# Patient Record
Sex: FEMALE | Race: WHITE | HISPANIC OR LATINO
[De-identification: names, ages, dates, MRNs, and addresses within clinical notes are randomized per-mention and may not be internally consistent; named-entity substitution may affect disease eponyms.]

---

## 2017-02-14 ENCOUNTER — RX RENEWAL (OUTPATIENT)
Age: 82
End: 2017-02-14

## 2017-03-08 ENCOUNTER — APPOINTMENT (OUTPATIENT)
Dept: PULMONOLOGY | Facility: CLINIC | Age: 82
End: 2017-03-08

## 2017-03-08 VITALS — HEART RATE: 74 BPM | DIASTOLIC BLOOD PRESSURE: 70 MMHG | OXYGEN SATURATION: 97 % | SYSTOLIC BLOOD PRESSURE: 126 MMHG

## 2017-03-08 DIAGNOSIS — Z85.038 PERSONAL HISTORY OF OTHER MALIGNANT NEOPLASM OF LARGE INTESTINE: ICD-10-CM

## 2017-03-17 ENCOUNTER — OUTPATIENT (OUTPATIENT)
Dept: OUTPATIENT SERVICES | Facility: HOSPITAL | Age: 82
LOS: 1 days | Discharge: HOME | End: 2017-03-17

## 2017-03-17 DIAGNOSIS — C18.9 MALIGNANT NEOPLASM OF COLON, UNSPECIFIED: ICD-10-CM

## 2017-03-20 ENCOUNTER — APPOINTMENT (OUTPATIENT)
Dept: HEMATOLOGY ONCOLOGY | Facility: CLINIC | Age: 82
End: 2017-03-20

## 2017-03-20 VITALS
RESPIRATION RATE: 16 BRPM | TEMPERATURE: 98.6 F | SYSTOLIC BLOOD PRESSURE: 138 MMHG | DIASTOLIC BLOOD PRESSURE: 79 MMHG | HEART RATE: 102 BPM | OXYGEN SATURATION: 90 %

## 2017-03-22 ENCOUNTER — FORM ENCOUNTER (OUTPATIENT)
Age: 82
End: 2017-03-22

## 2017-03-23 ENCOUNTER — OUTPATIENT (OUTPATIENT)
Dept: OUTPATIENT SERVICES | Facility: HOSPITAL | Age: 82
LOS: 1 days | End: 2017-03-23
Payer: MEDICARE

## 2017-03-23 ENCOUNTER — APPOINTMENT (OUTPATIENT)
Dept: RADIOLOGY | Facility: IMAGING CENTER | Age: 82
End: 2017-03-23

## 2017-03-23 DIAGNOSIS — J44.9 CHRONIC OBSTRUCTIVE PULMONARY DISEASE, UNSPECIFIED: ICD-10-CM

## 2017-03-23 PROCEDURE — 71046 X-RAY EXAM CHEST 2 VIEWS: CPT

## 2017-03-28 ENCOUNTER — APPOINTMENT (OUTPATIENT)
Dept: COLORECTAL SURGERY | Facility: CLINIC | Age: 82
End: 2017-03-28

## 2017-03-28 VITALS
BODY MASS INDEX: 29.44 KG/M2 | HEART RATE: 80 BPM | RESPIRATION RATE: 16 BRPM | HEIGHT: 62 IN | WEIGHT: 160 LBS | SYSTOLIC BLOOD PRESSURE: 141 MMHG | DIASTOLIC BLOOD PRESSURE: 84 MMHG | OXYGEN SATURATION: 94 %

## 2017-03-28 RX ORDER — ALPRAZOLAM 0.5 MG/1
0.5 TABLET ORAL
Refills: 0 | Status: ACTIVE | COMMUNITY

## 2017-03-28 RX ORDER — OXYCODONE AND ACETAMINOPHEN 10; 325 MG/1; MG/1
10-325 TABLET ORAL
Refills: 0 | Status: ACTIVE | COMMUNITY

## 2017-03-28 RX ORDER — DEXTROMETHORPHAN HYDROBROMIDE AND QUINIDINE SULFATE 20; 10 MG/1; MG/1
20-10 CAPSULE, GELATIN COATED ORAL
Refills: 0 | Status: ACTIVE | COMMUNITY

## 2017-03-28 RX ORDER — ECONAZOLE NITRATE 10 MG/G
1 CREAM TOPICAL
Refills: 0 | Status: ACTIVE | COMMUNITY

## 2017-04-11 ENCOUNTER — OUTPATIENT (OUTPATIENT)
Dept: OUTPATIENT SERVICES | Facility: HOSPITAL | Age: 82
LOS: 1 days | End: 2017-04-11
Payer: MEDICARE

## 2017-04-11 ENCOUNTER — APPOINTMENT (OUTPATIENT)
Dept: CT IMAGING | Facility: IMAGING CENTER | Age: 82
End: 2017-04-11

## 2017-04-11 DIAGNOSIS — Z00.8 ENCOUNTER FOR OTHER GENERAL EXAMINATION: ICD-10-CM

## 2017-04-11 PROCEDURE — 74177 CT ABD & PELVIS W/CONTRAST: CPT | Mod: 26

## 2017-04-11 PROCEDURE — 74177 CT ABD & PELVIS W/CONTRAST: CPT

## 2017-04-11 PROCEDURE — 82565 ASSAY OF CREATININE: CPT

## 2017-04-20 ENCOUNTER — OUTPATIENT (OUTPATIENT)
Dept: OUTPATIENT SERVICES | Facility: HOSPITAL | Age: 82
LOS: 1 days | End: 2017-04-20
Payer: MEDICARE

## 2017-04-20 ENCOUNTER — APPOINTMENT (OUTPATIENT)
Dept: ULTRASOUND IMAGING | Facility: IMAGING CENTER | Age: 82
End: 2017-04-20

## 2017-04-20 DIAGNOSIS — Z00.8 ENCOUNTER FOR OTHER GENERAL EXAMINATION: ICD-10-CM

## 2017-04-20 PROCEDURE — 76700 US EXAM ABDOM COMPLETE: CPT

## 2017-05-02 ENCOUNTER — APPOINTMENT (OUTPATIENT)
Dept: PULMONOLOGY | Facility: CLINIC | Age: 82
End: 2017-05-02

## 2017-05-02 VITALS — SYSTOLIC BLOOD PRESSURE: 118 MMHG | OXYGEN SATURATION: 93 % | DIASTOLIC BLOOD PRESSURE: 56 MMHG | HEART RATE: 87 BPM

## 2017-05-02 DIAGNOSIS — Z01.818 ENCOUNTER FOR OTHER PREPROCEDURAL EXAMINATION: ICD-10-CM

## 2017-05-23 ENCOUNTER — APPOINTMENT (OUTPATIENT)
Dept: COLORECTAL SURGERY | Facility: CLINIC | Age: 82
End: 2017-05-23

## 2017-07-21 ENCOUNTER — APPOINTMENT (OUTPATIENT)
Dept: PULMONOLOGY | Facility: CLINIC | Age: 82
End: 2017-07-21

## 2017-07-21 VITALS — HEART RATE: 90 BPM | SYSTOLIC BLOOD PRESSURE: 124 MMHG | DIASTOLIC BLOOD PRESSURE: 76 MMHG | OXYGEN SATURATION: 92 %

## 2017-08-30 ENCOUNTER — RX RENEWAL (OUTPATIENT)
Age: 82
End: 2017-08-30

## 2017-10-24 ENCOUNTER — APPOINTMENT (OUTPATIENT)
Dept: PULMONOLOGY | Facility: CLINIC | Age: 82
End: 2017-10-24
Payer: MEDICARE

## 2017-10-24 VITALS
OXYGEN SATURATION: 92 % | HEART RATE: 97 BPM | SYSTOLIC BLOOD PRESSURE: 126 MMHG | DIASTOLIC BLOOD PRESSURE: 80 MMHG | TEMPERATURE: 98 F

## 2017-10-24 PROCEDURE — 99214 OFFICE O/P EST MOD 30 MIN: CPT

## 2018-01-17 ENCOUNTER — RX RENEWAL (OUTPATIENT)
Age: 83
End: 2018-01-17

## 2018-02-06 ENCOUNTER — RX RENEWAL (OUTPATIENT)
Age: 83
End: 2018-02-06

## 2018-03-19 ENCOUNTER — RX RENEWAL (OUTPATIENT)
Age: 83
End: 2018-03-19

## 2018-03-20 ENCOUNTER — RX RENEWAL (OUTPATIENT)
Age: 83
End: 2018-03-20

## 2018-05-15 ENCOUNTER — RX RENEWAL (OUTPATIENT)
Age: 83
End: 2018-05-15

## 2018-05-18 ENCOUNTER — RESULT REVIEW (OUTPATIENT)
Age: 83
End: 2018-05-18

## 2018-05-18 ENCOUNTER — INPATIENT (INPATIENT)
Facility: HOSPITAL | Age: 83
LOS: 0 days | Discharge: HOME | End: 2018-05-19
Attending: HOSPITALIST | Admitting: HOSPITALIST
Payer: MEDICARE

## 2018-05-18 ENCOUNTER — OUTPATIENT (OUTPATIENT)
Dept: OUTPATIENT SERVICES | Facility: HOSPITAL | Age: 83
LOS: 1 days | Discharge: HOME | End: 2018-05-18

## 2018-05-18 ENCOUNTER — APPOINTMENT (OUTPATIENT)
Dept: HEMATOLOGY ONCOLOGY | Facility: CLINIC | Age: 83
End: 2018-05-18
Payer: MEDICARE

## 2018-05-18 VITALS
OXYGEN SATURATION: 96 % | SYSTOLIC BLOOD PRESSURE: 150 MMHG | TEMPERATURE: 98 F | RESPIRATION RATE: 20 BRPM | HEART RATE: 88 BPM | DIASTOLIC BLOOD PRESSURE: 58 MMHG

## 2018-05-18 VITALS
TEMPERATURE: 98 F | RESPIRATION RATE: 16 BRPM | HEART RATE: 88 BPM | SYSTOLIC BLOOD PRESSURE: 120 MMHG | DIASTOLIC BLOOD PRESSURE: 72 MMHG | OXYGEN SATURATION: 92 %

## 2018-05-18 DIAGNOSIS — C18.9 MALIGNANT NEOPLASM OF COLON, UNSPECIFIED: ICD-10-CM

## 2018-05-18 DIAGNOSIS — F03.91 UNSPECIFIED DEMENTIA WITH BEHAVIORAL DISTURBANCE: ICD-10-CM

## 2018-05-18 DIAGNOSIS — R60.1 GENERALIZED EDEMA: ICD-10-CM

## 2018-05-18 DIAGNOSIS — R14.0 ABDOMINAL DISTENSION (GASEOUS): ICD-10-CM

## 2018-05-18 DIAGNOSIS — H57.02 ANISOCORIA: ICD-10-CM

## 2018-05-18 DIAGNOSIS — D64.9 ANEMIA, UNSPECIFIED: ICD-10-CM

## 2018-05-18 DIAGNOSIS — J44.1 CHRONIC OBSTRUCTIVE PULMONARY DISEASE WITH (ACUTE) EXACERBATION: ICD-10-CM

## 2018-05-18 DIAGNOSIS — Z90.49 ACQUIRED ABSENCE OF OTHER SPECIFIED PARTS OF DIGESTIVE TRACT: Chronic | ICD-10-CM

## 2018-05-18 DIAGNOSIS — Z29.9 ENCOUNTER FOR PROPHYLACTIC MEASURES, UNSPECIFIED: ICD-10-CM

## 2018-05-18 DIAGNOSIS — K92.1 MELENA: ICD-10-CM

## 2018-05-18 LAB
ALBUMIN SERPL ELPH-MCNC: 3.6 G/DL — SIGNIFICANT CHANGE UP (ref 3.3–5)
ALP SERPL-CCNC: 72 U/L — SIGNIFICANT CHANGE UP (ref 40–120)
ALT FLD-CCNC: 10 U/L — SIGNIFICANT CHANGE UP (ref 4–33)
APTT BLD: 28.3 SEC — SIGNIFICANT CHANGE UP (ref 27.5–37.4)
AST SERPL-CCNC: 17 U/L — SIGNIFICANT CHANGE UP (ref 4–32)
BASOPHILS # BLD AUTO: 0 K/UL — SIGNIFICANT CHANGE UP (ref 0–0.2)
BASOPHILS # BLD AUTO: 0.02 K/UL — SIGNIFICANT CHANGE UP (ref 0–0.2)
BASOPHILS NFR BLD AUTO: 0.2 % — SIGNIFICANT CHANGE UP (ref 0–2)
BASOPHILS NFR BLD AUTO: 0.5 % — SIGNIFICANT CHANGE UP (ref 0–2)
BILIRUB SERPL-MCNC: < 0.2 MG/DL — LOW (ref 0.2–1.2)
BLD GP AB SCN SERPL QL: NEGATIVE — SIGNIFICANT CHANGE UP
BUN SERPL-MCNC: 25 MG/DL — HIGH (ref 7–23)
CALCIUM SERPL-MCNC: 9.6 MG/DL — SIGNIFICANT CHANGE UP (ref 8.4–10.5)
CHLORIDE SERPL-SCNC: 91 MMOL/L — LOW (ref 98–107)
CO2 SERPL-SCNC: 37 MMOL/L — HIGH (ref 22–31)
CREAT SERPL-MCNC: 1.32 MG/DL — HIGH (ref 0.5–1.3)
EOSINOPHIL # BLD AUTO: 0.7 K/UL — HIGH (ref 0–0.5)
EOSINOPHIL # BLD AUTO: 0.71 K/UL — HIGH (ref 0–0.5)
EOSINOPHIL NFR BLD AUTO: 7.8 % — HIGH (ref 0–6)
EOSINOPHIL NFR BLD AUTO: 7.9 % — HIGH (ref 0–6)
FERRITIN SERPL-MCNC: 13.55 NG/ML — LOW (ref 15–150)
GLUCOSE SERPL-MCNC: 134 MG/DL — HIGH (ref 70–99)
HCT VFR BLD CALC: 24.8 % — LOW (ref 34.5–45)
HCT VFR BLD CALC: 26.7 % — LOW (ref 34.5–45)
HGB BLD-MCNC: 7.6 G/DL — LOW (ref 11.5–15.5)
HGB BLD-MCNC: 7.8 G/DL — LOW (ref 11.5–15.5)
IMM GRANULOCYTES # BLD AUTO: 0.03 # — SIGNIFICANT CHANGE UP
IMM GRANULOCYTES NFR BLD AUTO: 0.3 % — SIGNIFICANT CHANGE UP (ref 0–1.5)
INR BLD: 0.98 — SIGNIFICANT CHANGE UP (ref 0.88–1.17)
IRON SATN MFR SERPL: 24 UG/DL — LOW (ref 30–160)
IRON SATN MFR SERPL: 424 UG/DL — SIGNIFICANT CHANGE UP (ref 140–530)
LYMPHOCYTES # BLD AUTO: 1.4 K/UL — SIGNIFICANT CHANGE UP (ref 1–3.3)
LYMPHOCYTES # BLD AUTO: 1.49 K/UL — SIGNIFICANT CHANGE UP (ref 1–3.3)
LYMPHOCYTES # BLD AUTO: 15.9 % — SIGNIFICANT CHANGE UP (ref 13–44)
LYMPHOCYTES # BLD AUTO: 16.4 % — SIGNIFICANT CHANGE UP (ref 13–44)
MCHC RBC-ENTMCNC: 23.5 PG — LOW (ref 27–34)
MCHC RBC-ENTMCNC: 24.4 PG — LOW (ref 27–34)
MCHC RBC-ENTMCNC: 28.5 % — LOW (ref 32–36)
MCHC RBC-ENTMCNC: 31.4 G/DL — LOW (ref 32–36)
MCV RBC AUTO: 77.9 FL — LOW (ref 80–100)
MCV RBC AUTO: 82.4 FL — SIGNIFICANT CHANGE UP (ref 80–100)
MONOCYTES # BLD AUTO: 0.82 K/UL — SIGNIFICANT CHANGE UP (ref 0–0.9)
MONOCYTES # BLD AUTO: 0.9 K/UL — SIGNIFICANT CHANGE UP (ref 0–0.9)
MONOCYTES NFR BLD AUTO: 10.1 % — SIGNIFICANT CHANGE UP (ref 2–14)
MONOCYTES NFR BLD AUTO: 9.1 % — SIGNIFICANT CHANGE UP (ref 2–14)
NEUTROPHILS # BLD AUTO: 5.8 K/UL — SIGNIFICANT CHANGE UP (ref 1.8–7.4)
NEUTROPHILS # BLD AUTO: 5.99 K/UL — SIGNIFICANT CHANGE UP (ref 1.8–7.4)
NEUTROPHILS NFR BLD AUTO: 65.6 % — SIGNIFICANT CHANGE UP (ref 43–77)
NEUTROPHILS NFR BLD AUTO: 66.2 % — SIGNIFICANT CHANGE UP (ref 43–77)
NRBC # FLD: 0 — SIGNIFICANT CHANGE UP
NT-PROBNP SERPL-SCNC: 90.12 PG/ML — SIGNIFICANT CHANGE UP
OB PNL STL: POSITIVE — SIGNIFICANT CHANGE UP
PLATELET # BLD AUTO: 150 K/UL — SIGNIFICANT CHANGE UP (ref 150–400)
PLATELET # BLD AUTO: 204 K/UL — SIGNIFICANT CHANGE UP (ref 150–400)
PMV BLD: 10.3 FL — SIGNIFICANT CHANGE UP (ref 7–13)
POTASSIUM SERPL-MCNC: 5.2 MMOL/L — SIGNIFICANT CHANGE UP (ref 3.5–5.3)
POTASSIUM SERPL-SCNC: 5.2 MMOL/L — SIGNIFICANT CHANGE UP (ref 3.5–5.3)
PROT SERPL-MCNC: 7.9 G/DL — SIGNIFICANT CHANGE UP (ref 6–8.3)
PROTHROM AB SERPL-ACNC: 10.9 SEC — SIGNIFICANT CHANGE UP (ref 9.8–13.1)
RBC # BLD: 3.18 M/UL — LOW (ref 3.8–5.2)
RBC # BLD: 3.24 M/UL — LOW (ref 3.8–5.2)
RBC # FLD: 16.5 % — HIGH (ref 10.3–14.5)
RBC # FLD: 16.8 % — HIGH (ref 10.3–14.5)
RETICS #: 54 K/UL — SIGNIFICANT CHANGE UP (ref 25–125)
RETICS/RBC NFR: 1.7 % — SIGNIFICANT CHANGE UP (ref 0.5–2.5)
RH IG SCN BLD-IMP: POSITIVE — SIGNIFICANT CHANGE UP
RH IG SCN BLD-IMP: POSITIVE — SIGNIFICANT CHANGE UP
SODIUM SERPL-SCNC: 136 MMOL/L — SIGNIFICANT CHANGE UP (ref 135–145)
TROPONIN T SERPL-MCNC: < 0.06 NG/ML — SIGNIFICANT CHANGE UP (ref 0–0.06)
UIBC SERPL-MCNC: 400.1 UG/DL — HIGH (ref 110–370)
WBC # BLD: 8.8 K/UL — SIGNIFICANT CHANGE UP (ref 3.8–10.5)
WBC # BLD: 9.06 K/UL — SIGNIFICANT CHANGE UP (ref 3.8–10.5)
WBC # FLD AUTO: 8.8 K/UL — SIGNIFICANT CHANGE UP (ref 3.8–10.5)
WBC # FLD AUTO: 9.06 K/UL — SIGNIFICANT CHANGE UP (ref 3.8–10.5)

## 2018-05-18 PROCEDURE — 99215 OFFICE O/P EST HI 40 MIN: CPT

## 2018-05-18 PROCEDURE — 71045 X-RAY EXAM CHEST 1 VIEW: CPT | Mod: 26

## 2018-05-18 PROCEDURE — 99223 1ST HOSP IP/OBS HIGH 75: CPT | Mod: AI,GC

## 2018-05-18 RX ORDER — IPRATROPIUM/ALBUTEROL SULFATE 18-103MCG
3 AEROSOL WITH ADAPTER (GRAM) INHALATION EVERY 6 HOURS
Qty: 0 | Refills: 0 | Status: DISCONTINUED | OUTPATIENT
Start: 2018-05-18 | End: 2018-05-19

## 2018-05-18 RX ORDER — ALBUTEROL 90 UG/1
2.5 AEROSOL, METERED ORAL ONCE
Qty: 0 | Refills: 0 | Status: COMPLETED | OUTPATIENT
Start: 2018-05-18 | End: 2018-05-18

## 2018-05-18 RX ORDER — ESCITALOPRAM OXALATE 10 MG/1
10 TABLET, FILM COATED ORAL DAILY
Qty: 0 | Refills: 0 | Status: DISCONTINUED | OUTPATIENT
Start: 2018-05-18 | End: 2018-05-19

## 2018-05-18 RX ORDER — ASPIRIN/CALCIUM CARB/MAGNESIUM 324 MG
81 TABLET ORAL DAILY
Qty: 0 | Refills: 0 | Status: DISCONTINUED | OUTPATIENT
Start: 2018-05-18 | End: 2018-05-19

## 2018-05-18 RX ORDER — SIMVASTATIN 20 MG/1
20 TABLET, FILM COATED ORAL AT BEDTIME
Qty: 0 | Refills: 0 | Status: DISCONTINUED | OUTPATIENT
Start: 2018-05-18 | End: 2018-05-19

## 2018-05-18 RX ORDER — LEVOTHYROXINE SODIUM 125 MCG
100 TABLET ORAL DAILY
Qty: 0 | Refills: 0 | Status: DISCONTINUED | OUTPATIENT
Start: 2018-05-18 | End: 2018-05-19

## 2018-05-18 RX ORDER — MIRTAZAPINE 45 MG/1
15 TABLET, ORALLY DISINTEGRATING ORAL AT BEDTIME
Qty: 0 | Refills: 0 | Status: DISCONTINUED | OUTPATIENT
Start: 2018-05-18 | End: 2018-05-19

## 2018-05-18 RX ORDER — ALPRAZOLAM 0.25 MG
0.5 TABLET ORAL
Qty: 0 | Refills: 0 | Status: DISCONTINUED | OUTPATIENT
Start: 2018-05-18 | End: 2018-05-19

## 2018-05-18 RX ORDER — QUETIAPINE FUMARATE 200 MG/1
300 TABLET, FILM COATED ORAL
Qty: 0 | Refills: 0 | Status: DISCONTINUED | OUTPATIENT
Start: 2018-05-18 | End: 2018-05-19

## 2018-05-18 RX ORDER — FUROSEMIDE 40 MG
40 TABLET ORAL ONCE
Qty: 0 | Refills: 0 | Status: COMPLETED | OUTPATIENT
Start: 2018-05-18 | End: 2018-05-18

## 2018-05-18 RX ADMIN — Medication 40 MILLIGRAM(S): at 18:50

## 2018-05-18 RX ADMIN — SIMVASTATIN 20 MILLIGRAM(S): 20 TABLET, FILM COATED ORAL at 22:59

## 2018-05-18 RX ADMIN — MIRTAZAPINE 15 MILLIGRAM(S): 45 TABLET, ORALLY DISINTEGRATING ORAL at 22:59

## 2018-05-18 RX ADMIN — Medication 0.5 MILLIGRAM(S): at 18:50

## 2018-05-18 RX ADMIN — Medication 3 MILLILITER(S): at 22:13

## 2018-05-18 RX ADMIN — QUETIAPINE FUMARATE 300 MILLIGRAM(S): 200 TABLET, FILM COATED ORAL at 18:50

## 2018-05-18 RX ADMIN — ALBUTEROL 2.5 MILLIGRAM(S): 90 AEROSOL, METERED ORAL at 14:34

## 2018-05-18 RX ADMIN — Medication 0.5 MILLIGRAM(S): at 23:00

## 2018-05-18 NOTE — ED ADULT NURSE NOTE - OBJECTIVE STATEMENT
Received pt. in room 6 sent by oncologist to ER for low hemoglobin and a blood transfusion. Patient have a history of colorectal cancer, dementia. As stated by son at bedside  patient went to her PCP for a check-up hemoglobin is low, pt. sent to oncologist for further evaluation. Patient is awake and non verbal that is patient's baseline as per son, make eye contact. Both lower legs and hands with edema. Placed on cardiac monitor, labs sent will continue to monitor

## 2018-05-18 NOTE — H&P ADULT - PROBLEM SELECTOR PLAN 5
Patietn with distended abdomen likely 2/2 to malignancy with mets  Nontender on exam, passing BM   Patient tolerating PO   Will defer imagining for now Patient with diffuse wheezing on exam, known COPD on 2L home oxygen  Duonebs standing   Will continue to monitor

## 2018-05-18 NOTE — ED PROVIDER NOTE - NONTENDER LOCATION
left lower quadrant/umbilical/right costovertebral angle/left upper quadrant/right upper quadrant/right lower quadrant/periumbilical/left costovertebral angle/suprapubic

## 2018-05-18 NOTE — ED ADULT NURSE NOTE - CHPI ED SYMPTOMS NEG
no pain/no vomiting/no decreased eating/drinking/no nausea/no numbness/no tingling/no dizziness/no fever/no chills/no weakness

## 2018-05-18 NOTE — ED ADULT TRIAGE NOTE - CHIEF COMPLAINT QUOTE
Pt was sent in by her oncologist for a blood transfusion.  Pt in traige is noted with be slight sob and wheezing she is currently on oxygen 24/7 at two liters pt non verbal at baseline.

## 2018-05-18 NOTE — H&P ADULT - PROBLEM SELECTOR PLAN 8
DVT: SCDs given active bleed  Diet: Pureed (home)  GOC: Per family, son HCP, patient DNR/DNI, no form in chart, will sign when son arrives. Family requesting hospice referral DVT: SCDs given active bleed  Diet: Pureed (home)  GOC: Per family, son HCP, patient DNR/DNI, no form in chart, will sign and put in chart. Family requesting hospice referral. Will sign MOLST.

## 2018-05-18 NOTE — H&P ADULT - PROBLEM SELECTOR PLAN 1
Patient with Hg 7.6, unclear baseline, per records 11 in 2017 likely chronic anemia given HD stability, FOCT +, however known colon malignancy   Will transfuse 1 unit as patient symptomatic with less ambulation  (goal >7)  Will f/u iron studies  Will continue to monitor Patient with Hg 7.6, unclear baseline, per records 11 in 2017 likely chronic anemia given HD stability, FOCT +, however known colon malignancy   Will transfuse 1 unit as patient symptomatic with less ambulation  (goal 7-8)  Will f/u iron studies  Will continue to monitor

## 2018-05-18 NOTE — ED PROVIDER NOTE - OBJECTIVE STATEMENT
Pt is an 89 y/o F nonsmoker PMHx dementia, COPD (on 2 L n/c at baseline), colon cancer (h/o resection, recurrence for ~ 1.5 years; family declined surgery, chemo/radiation) sent to [facility] ED for transfusion.  History largely obtained by pt's son.  Pt had routine follow up with PMD Dr. Granados 2 wks ago.  Pt was notified yesterday that pt had Hgb 7.7.  Pt family attempted to set up outpatient transfusion with oncologist Dr. Sebastián Lockett who requested pt go to [facility] ED for transfusion.  Pt's family also notes that pt has developed generalized edema, which pt's family states happens every few weeks, then resolves.  Otherwise, pt's son insists pt is completely at baseline.  Pt has been able to ambulate to and from bathroom without difficulty.  Family notes brbpr yesterday.  No known fevers, vomiting, chest pain, abdominal pain, diarrhea, use of blood thinners.

## 2018-05-18 NOTE — H&P ADULT - PROBLEM SELECTOR PLAN 3
Patient with generalized edema to face, arms, legs to above knees 2+  No known cardiac hx, or cirrhosis however likely mets   Will give Lasix (home dosage 40 every other day with 20 mg)  UA ordered to evaluate protein   Will give IV Lasix   Will consider TTE Patient with generalized edema to face, arms, legs to above knees 2+  No known cardiac hx, or cirrhosis however likely mets   Will give Lasix (home dosage 40 every other day with 20 mg)  UA ordered to evaluate protein   Will give IV Lasix

## 2018-05-18 NOTE — H&P ADULT - ATTENDING COMMENTS
Pt's daughter in law asking for Hospice evaluation.  Team to confirm with son, who is HCA, re AD/GOC.

## 2018-05-18 NOTE — ED PROVIDER NOTE - PROGRESS NOTE DETAILS
LAVON GUZMÁN:  Guiac positive.  Hgb 7.6.  Pt accepted to Dr. Gonzalez.  Discussed case with Dr. Granados who states pt's baseline hgb 11 < 1 year ago.  MAR text paged at this time.

## 2018-05-18 NOTE — ED PROVIDER NOTE - ATTENDING CONTRIBUTION TO CARE
89yo F PMH: dementia, COPD (on home 02), colon cancer (h/o resection, with recurrence) sent to [facility] ED for transfusion, family notes no other specific acute complaints, pt does have SOB but unchanged at baseline  On exam awake & alert, mmm, lungs diffuse, RRR, abdomen soft NT/ND, 2+ pulses b/l, neuro A&Ox0,  skin warm and dry no rash

## 2018-05-18 NOTE — H&P ADULT - ASSESSMENT
89F with PMHx of PMHx of dementia (AOx0), COPD (on home O2 2L), colon cancer (h/o resection, recurrence for ~ 1.5 years; family declined surgery, chemo/radiation) sent to [facility] ED for transfusion admitted with Hg 7.6 likely 2/2 to chronic anemia 2/2 to malignancy given HD stability

## 2018-05-18 NOTE — H&P ADULT - PROBLEM SELECTOR PLAN 4
Patient with diffuse wheezing on exam, known COPD on 2L home oxygen  Duonebs, will consider steroids and abx  Will continue to monitor Patient with diffuse wheezing on exam, known COPD on 2L home oxygen  Duonebs standing   Will continue to monitor Patient with demential, AOx0 at baseline, minimally verbal  Continue home medications  ISTOP [id number], Alprazolam and oxycodone 30 IR confirmed (in chart)  Currently at baseline

## 2018-05-18 NOTE — H&P ADULT - PROBLEM SELECTOR PLAN 7
Patient with demential, AOx0 at baseline, minimally verbal  Continue home medications  Currently at baseline Patient with demential, AOx0 at baseline, minimally verbal  Continue home medications  ISTOP [id number], Alprazolam and oxycodone 30 IR confirmed (in chart)  Currently at baseline Patient with unequal pupils RT larger than LT  Possibly 2/2 to mets, difficult to assess neurological function  Will consider CTH

## 2018-05-18 NOTE — H&P ADULT - NSHPPHYSICALEXAM_GEN_ALL_CORE
· Constitutional        Lying in bed comfortably. No acute distress, tongue motion, minimally verbal  · HEENT	               pupils UNEQUAL, but reactive  · Neck	               Supple; no JVD  · Back	                ROM intact  · Respiratory             good air movement; diffuse expiratory wheezing bilaterally   · Cardiovascular       S1 & S2 with RRR; no murmurs, rales or JVD  · Gastrointestinal     ++distended, slightly firm, bowel sounds appreciated, nontender  · Extremities             2+ pitting edema to knees   · Vascular	               Radial Pulse: right normal; left normal  · Neurological           alert and oriented x 0, minimally verbal, does not comprehend (baseline)  · Skin	               warm and dry  · Musculoskeletal    no calf tenderness; diminished strength  · Psychiatric              at baseline per family

## 2018-05-18 NOTE — H&P ADULT - NSHPLABSRESULTS_GEN_ALL_CORE
T(C): 36.8 (05-18-18 @ 16:46), Max: 36.9 (05-18-18 @ 12:03)  T(F): 98.2 (05-18-18 @ 16:46), Max: 98.5 (05-18-18 @ 12:03)  HR: 88 (05-18-18 @ 16:46) (87 - 88)  BP: 154/58 (05-18-18 @ 16:46) (150/58 - 170/66)  RR: 20 (05-18-18 @ 16:46) (20 - 22)  SpO2: 100% (05-18-18 @ 16:46) (96% - 100%)                          7.6    9.06  )-----------( 204      ( 18 May 2018 13:08 )             26.7       05-18    136  |  91<L>  |  25<H>  ----------------------------<  134<H>  5.2   |  37<H>  |  1.32<H>    Ca    9.6      18 May 2018 13:08    TPro  7.9  /  Alb  3.6  /  TBili  < 0.2<L>  /  DBili  x   /  AST  17  /  ALT  10  /  AlkPhos  72  05-18        < from: Xray Chest 1 View- PORTABLE-Urgent (05.18.18 @ 13:31) >    INTERPRETATION:  CLINICAL INDICATION: wheezing    EXAM:  Portable upright AP chest from 5/18/2018 at 1331. Compared to prior study   from 3/23/2017.    Projection lordotic.  Slightly rotated right.     IMPRESSION:  Underinflated but otherwise clear lungs. No pleural effusions or   pneumothorax.    Stable sightly prominent appearing cardiac and mediastinal silhouettes.    Trachea projects to right of midline but proportionate to degree of   patient rotation.    Generalized osteopenia and bilateral shoulder degenerative changes again   noted.    Nonspecific slightly distended bowel noted in visualized upper abdomen,   correlate clinically with dedicated abdominal imaging suggested to   further assess if warranted. No subdiaphragmatic free air.     Right upper quadrant surgical clips present.      < end of copied text >

## 2018-05-18 NOTE — H&P ADULT - HISTORY OF PRESENT ILLNESS
Pt is an 88 y/o F nonsmoker PMHx dementia, COPD (on 2 L n/c at baseline), colon cancer (h/o resection, recurrence for ~ 1.5 years; family declined surgery, chemo/radiation) sent to [facility] ED for transfusion.  History largely obtained by pt's son.  Pt had routine follow up with PMD Dr. Granados 2 wks ago.  Pt was notified yesterday that pt had Hgb 7.7.  Pt family attempted to set up outpatient transfusion with oncologist Dr. Sebastián Lockett who requested pt go to [facility] ED for transfusion.  Pt's family also notes that pt has developed generalized edema, which pt's family states happens every few weeks, then resolves.  Otherwise, pt's son insists pt is completely at baseline.  Pt has been able to ambulate to and from bathroom without difficulty.  Family notes brbpr yesterday.  No known fevers, vomiting, chest pain, abdominal pain, diarrhea, use of blood thinners.H/o dementia, nonverbal at baseline 89F with PMHx of PMHx of dementia (AOx0), COPD (on home O2 2L), colon cancer (h/o resection, recurrence for ~ 1.5 years; family declined surgery, chemo/radiation) sent to [facility] ED for transfusion. History obtained from chart and patient's daughter in law at bedside. Patient has routine follow up with her PCP Dr. Grnaados 2 weeks ago. Patient and family were notified yesterday that pt had Hgb 7.7. Family recalls for the past two weeks, her stools have been "very smelly" and soft. No prior occurrence They were aware she was likely chronically bleeding from colon. Family attempted to set up outpatient transfusion with oncologist Dr. Sebastián Lockett who requested pt go to [facility] ED for transfusion due to multiple comorbidities. Family has also noticed she has developed generalized edema arms, face, and legs with no prior episode. 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 No prior edema episodes. Family reports, patient is currently at her baseline at AOx0 and level of alertness They have noticed she has been walking less. She is able to ambulate with 2 person assist. She is minimally verbal, reacts to "[place]". She has HHA services 12 hrs/ day. Per daughter in law, son is health care proxy. Patient is DNR/DNI, NO form in chart will need to sign with son. Family denies chest pain, SOB, abdominal pain, depressed mental status. HOwever, difficult to assess ROS.     In ED: /58 HR88 RR20 T98.5 O2 96% on 2L  Patient received albuterol 2.5 nebulizer x 1

## 2018-05-18 NOTE — H&P ADULT - PROBLEM SELECTOR PLAN 6
Patient with unequal pupils RT larger than LT  Possibly 2/2 to mets, difficult to assess neurological function  Will consider CTH Patietn with distended abdomen likely 2/2 to malignancy with mets  Nontender on exam, passing BM   Patient tolerating PO   Will defer imagining for now

## 2018-05-18 NOTE — H&P ADULT - NSHPSOCIALHISTORY_GEN_ALL_CORE
never smoker, no ETOH use, lives with son and daughter in law, minimally verbal, 2 person assist, has HHA

## 2018-05-18 NOTE — ED PROVIDER NOTE - MEDICAL DECISION MAKING DETAILS
Pt is an 89 y/o F nonsmoker PMHx dementia, COPD (on 2 L n/c at baseline), colon cancer (h/o resection, recurrence for ~ 1.5 years; family declined surgery, chemo/radiation) sent to [facility] ED for transfusion -- anemia, possible GI bleed -- labs, guiac, admit

## 2018-05-19 ENCOUNTER — TRANSCRIPTION ENCOUNTER (OUTPATIENT)
Age: 83
End: 2018-05-19

## 2018-05-19 VITALS — OXYGEN SATURATION: 97 %

## 2018-05-19 LAB
BUN SERPL-MCNC: 24 MG/DL — HIGH (ref 7–23)
CALCIUM SERPL-MCNC: 8.8 MG/DL — SIGNIFICANT CHANGE UP (ref 8.4–10.5)
CHLORIDE SERPL-SCNC: 94 MMOL/L — LOW (ref 98–107)
CO2 SERPL-SCNC: 39 MMOL/L — HIGH (ref 22–31)
CREAT SERPL-MCNC: 1.21 MG/DL — SIGNIFICANT CHANGE UP (ref 0.5–1.3)
GLUCOSE SERPL-MCNC: 104 MG/DL — HIGH (ref 70–99)
HCT VFR BLD CALC: 24.9 % — LOW (ref 34.5–45)
HCT VFR BLD CALC: 27 % — LOW (ref 34.5–45)
HGB BLD-MCNC: 7.5 G/DL — LOW (ref 11.5–15.5)
HGB BLD-MCNC: 8.2 G/DL — LOW (ref 11.5–15.5)
MAGNESIUM SERPL-MCNC: 2 MG/DL — SIGNIFICANT CHANGE UP (ref 1.6–2.6)
MCHC RBC-ENTMCNC: 24.4 PG — LOW (ref 27–34)
MCHC RBC-ENTMCNC: 24.5 PG — LOW (ref 27–34)
MCHC RBC-ENTMCNC: 30.1 % — LOW (ref 32–36)
MCHC RBC-ENTMCNC: 30.4 % — LOW (ref 32–36)
MCV RBC AUTO: 80.6 FL — SIGNIFICANT CHANGE UP (ref 80–100)
MCV RBC AUTO: 81.1 FL — SIGNIFICANT CHANGE UP (ref 80–100)
NRBC # FLD: 0 — SIGNIFICANT CHANGE UP
NRBC # FLD: 0 — SIGNIFICANT CHANGE UP
PHOSPHATE SERPL-MCNC: 3.8 MG/DL — SIGNIFICANT CHANGE UP (ref 2.5–4.5)
PLATELET # BLD AUTO: 152 K/UL — SIGNIFICANT CHANGE UP (ref 150–400)
PLATELET # BLD AUTO: 158 K/UL — SIGNIFICANT CHANGE UP (ref 150–400)
PMV BLD: 10.1 FL — SIGNIFICANT CHANGE UP (ref 7–13)
PMV BLD: 10.3 FL — SIGNIFICANT CHANGE UP (ref 7–13)
POTASSIUM SERPL-MCNC: 4.5 MMOL/L — SIGNIFICANT CHANGE UP (ref 3.5–5.3)
POTASSIUM SERPL-SCNC: 4.5 MMOL/L — SIGNIFICANT CHANGE UP (ref 3.5–5.3)
RBC # BLD: 3.07 M/UL — LOW (ref 3.8–5.2)
RBC # BLD: 3.35 M/UL — LOW (ref 3.8–5.2)
RBC # FLD: 16.3 % — HIGH (ref 10.3–14.5)
RBC # FLD: 16.5 % — HIGH (ref 10.3–14.5)
SODIUM SERPL-SCNC: 139 MMOL/L — SIGNIFICANT CHANGE UP (ref 135–145)
WBC # BLD: 5.31 K/UL — SIGNIFICANT CHANGE UP (ref 3.8–10.5)
WBC # BLD: 5.68 K/UL — SIGNIFICANT CHANGE UP (ref 3.8–10.5)
WBC # FLD AUTO: 5.31 K/UL — SIGNIFICANT CHANGE UP (ref 3.8–10.5)
WBC # FLD AUTO: 5.68 K/UL — SIGNIFICANT CHANGE UP (ref 3.8–10.5)

## 2018-05-19 PROCEDURE — 99239 HOSP IP/OBS DSCHRG MGMT >30: CPT

## 2018-05-19 RX ORDER — FUROSEMIDE 40 MG
40 TABLET ORAL ONCE
Qty: 0 | Refills: 0 | Status: COMPLETED | OUTPATIENT
Start: 2018-05-19 | End: 2018-05-19

## 2018-05-19 RX ADMIN — ESCITALOPRAM OXALATE 10 MILLIGRAM(S): 10 TABLET, FILM COATED ORAL at 12:11

## 2018-05-19 RX ADMIN — Medication 3 MILLILITER(S): at 03:28

## 2018-05-19 RX ADMIN — Medication 100 MICROGRAM(S): at 05:16

## 2018-05-19 RX ADMIN — Medication 3 MILLILITER(S): at 15:37

## 2018-05-19 RX ADMIN — Medication 81 MILLIGRAM(S): at 12:11

## 2018-05-19 RX ADMIN — Medication 40 MILLIGRAM(S): at 12:09

## 2018-05-19 RX ADMIN — Medication 0.5 MILLIGRAM(S): at 05:16

## 2018-05-19 RX ADMIN — QUETIAPINE FUMARATE 300 MILLIGRAM(S): 200 TABLET, FILM COATED ORAL at 05:16

## 2018-05-19 RX ADMIN — Medication 0.5 MILLIGRAM(S): at 12:10

## 2018-05-19 NOTE — DISCHARGE NOTE ADULT - PLAN OF CARE
To follow up with [facility] for Home Hospice You have colon cancer which can cause chronic bleeding. Your blood counts are stable and you responded well to the 1 unit blood transfusion. Hospice Number: [phone]. Please call this number so we can help you set up home hospice services to make your mother comfortable. You have colon cancer which can cause chronic bleeding. Your blood counts are stable and you responded well to the 1 unit blood transfusion. Hospice Number: [phone]. Please call this number so we can help you set up home hospice services to make your mother comfortable.

## 2018-05-19 NOTE — DISCHARGE NOTE ADULT - PATIENT PORTAL LINK FT
You can access the [facility], offered by [facility], by registering with the following website: [email or web address]

## 2018-05-19 NOTE — PROGRESS NOTE ADULT - PROBLEM SELECTOR PLAN 7
Patient with unequal pupils RT larger than LT  Possibly 2/2 to mets however could be benign, difficult to assess neurological function  Will consider CTH Patient with unequal pupils RT larger than LT  Possibly 2/2 to mets however could be benign, difficult to assess neurological function  Will defer imagining at this time, as GOC hospice

## 2018-05-19 NOTE — DISCHARGE NOTE ADULT - HOSPITAL COURSE
89F with PMHx of PMHx of dementia (AOx0), COPD (on home O2 2L), colon cancer (h/o resection, recurrence for ~ 1.5 years; family declined surgery, chemo/radiation) sent to [facility] ED for transfusion. In ED: /58 HR88 RR20 T98.5 O2 96% on 2L. Patient received albuterol 2.5 nebulizer x 1. Patient's labs significant for Hg 7.6, unclear baseline, per PCP 9 in 2017 likely chronic anemia given HD stability. Patient's FOCT +, however known colon malignancy. Patient was transfused 1 unit pRBC as thought to be symptomatic with depressed mental status. Patient also with generalized edema, no known cardiac hx, or cirrhosis however likely mets. Patient was given Lasix 40 IV x 2 with improvement. Patient's Hg s/p transfusion with appropriate response to 8.2, likely goal Hg >7. Extensive GOC discussion with family at bedside. Patient DNR/DNI with HCP as son. Requesting hospice referral At baseline, patient AOx0, verbal, but not comprehensible. Patient hemodynamically stable for discharge.

## 2018-05-19 NOTE — PROGRESS NOTE ADULT - SUBJECTIVE AND OBJECTIVE BOX
Patient is a 89y old  Female who presents with a chief complaint of anemia (18 May 2018 16:54)      SUBJECTIVE / OVERNIGHT EVENTS: No acute overnight events. Patient s/p 1 unit PRBC. Patient seen and examined this AM. Patient verbal, however not comprehensible. Unable to assess ROS. However appears comfortable.     MEDICATIONS  (STANDING):  ALBUTerol/ipratropium for Nebulization 3 milliLiter(s) Nebulizer every 6 hours  ALPRAZolam 0.5 milliGRAM(s) Oral four times a day  aspirin enteric coated 81 milliGRAM(s) Oral daily  escitalopram 10 milliGRAM(s) Oral daily  levothyroxine 100 MICROGram(s) Oral daily  mirtazapine 15 milliGRAM(s) Oral at bedtime  QUEtiapine 300 milliGRAM(s) Oral two times a day  simvastatin 20 milliGRAM(s) Oral at bedtime    MEDICATIONS  (PRN):      T(C): 36.9 (05-19-18 @ 04:51), Max: 36.9 (05-18-18 @ 12:03)  HR: 82 (05-19-18 @ 04:51) (76 - 88)  BP: 134/86 (05-19-18 @ 04:51) (128/66 - 170/66)  RR: 18 (05-19-18 @ 04:51) (18 - 22)  SpO2: 99% (05-19-18 @ 04:51) (93% - 100%)    PHYSICAL EXAM  GENERAL: NAD, well-developed elderly female   NEURO: AO x0, verbal, not comprehensible   HEAD:  Atraumatic, Normocephalic  EYES: conjunctiva and sclera clear  NECK: Supple, No JVD  CHEST/LUNG: Minimal diffuse wheezing on exam, no crackles   HEART: Regular rate and rhythm; No murmurs, rubs, or gallops  ABDOMEN: Soft, ++distended, nontender, bowel sounds appreciated   EXTREMITIES:  2+pitting edema to below the knees (improved)  SKIN: Warm, dry, in tact, no rashes or lesions  PSYCH: affect appropriate    LABS:                        7.5    5.31  )-----------( 152      ( 19 May 2018 08:30 )             24.9     05-18    136  |  91<L>  |  25<H>  ----------------------------<  134<H>  5.2   |  37<H>  |  1.32<H>    Ca    9.6      18 May 2018 13:08    TPro  7.9  /  Alb  3.6  /  TBili  < 0.2<L>  /  DBili  x   /  AST  17  /  ALT  10  /  AlkPhos  72  05-18    PT/INR - ( 18 May 2018 13:09 )   PT: 10.9 SEC;   INR: 0.98          PTT - ( 18 May 2018 13:09 )  PTT:28.3 SEC  CARDIAC MARKERS ( 18 May 2018 13:08 )  x     / < 0.06 ng/mL / x     / x     / x            I&O's Summary      Chacha Issa MD  Internal Medicine Resident, PGY1  Pager: [phone] / [phone] Patient is a 89y old  Female who presents with a chief complaint of anemia (18 May 2018 16:54)      SUBJECTIVE / OVERNIGHT EVENTS: No acute overnight events. Patient s/p 1 unit PRBC. Patient seen and examined this AM. Patient verbal, however not comprehensible. Unable to assess ROS. However appears comfortable.     MEDICATIONS  (STANDING):  ALBUTerol/ipratropium for Nebulization 3 milliLiter(s) Nebulizer every 6 hours  ALPRAZolam 0.5 milliGRAM(s) Oral four times a day  aspirin enteric coated 81 milliGRAM(s) Oral daily  escitalopram 10 milliGRAM(s) Oral daily  levothyroxine 100 MICROGram(s) Oral daily  mirtazapine 15 milliGRAM(s) Oral at bedtime  QUEtiapine 300 milliGRAM(s) Oral two times a day  simvastatin 20 milliGRAM(s) Oral at bedtime    MEDICATIONS  (PRN):      T(C): 36.9 (05-19-18 @ 04:51), Max: 36.9 (05-18-18 @ 12:03)  HR: 82 (05-19-18 @ 04:51) (76 - 88)  BP: 134/86 (05-19-18 @ 04:51) (128/66 - 170/66)  RR: 18 (05-19-18 @ 04:51) (18 - 22)  SpO2: 99% (05-19-18 @ 04:51) (93% - 100%)    PHYSICAL EXAM  GENERAL: NAD, well-developed elderly female   NEURO: AO x0, verbal, not comprehensible   HEAD:  Atraumatic, Normocephalic  EYES: conjunctiva and sclera clear  NECK: Supple, No JVD  CHEST/LUNG: Minimal diffuse wheezing on exam, no crackles   HEART: Regular rate and rhythm; No murmurs, rubs, or gallops  ABDOMEN: Soft, ++distended, nontender, bowel sounds appreciated   EXTREMITIES:  2+pitting edema to below the knees (improved)  SKIN: Warm, dry, in tact, no rashes or lesions  PSYCH: affect appropriate    LABS:                        7.5    5.31  )-----------( 152      ( 19 May 2018 08:30 )             24.9     05-18    136  |  91<L>  |  25<H>  ----------------------------<  134<H>  5.2   |  37<H>  |  1.32<H>    Ca    9.6      18 May 2018 13:08    TPro  7.9  /  Alb  3.6  /  TBili  < 0.2<L>  /  DBili  x   /  AST  17  /  ALT  10  /  AlkPhos  72  05-18    PT/INR - ( 18 May 2018 13:09 )   PT: 10.9 SEC;   INR: 0.98          PTT - ( 18 May 2018 13:09 )  PTT:28.3 SEC  CARDIAC MARKERS ( 18 May 2018 13:08 )  x     / < 0.06 ng/mL / x     / x     / x        Complete Blood Count (05.19.18 @ 11:50)    WBC Count: 5.68 K/uL    RBC Count: 3.35 M/uL    Hemoglobin: 8.2 g/dL    Hematocrit: 27.0 %    Mean Cell Volume: 80.6 fL    Mean Cell Hemoglobin: 24.5 pg    Mean Cell Hemoglobin Conc: 30.4 %    Red Cell Distrib Width: 16.5 %    Platelet Count - Automated: 158 K/uL    MPV: 10.1 fl    Nucleated RBC #: 0        I&O's Summary      Chacha Issa MD  Internal Medicine Resident, PGY1  Pager: [phone] / [phone]

## 2018-05-19 NOTE — PROGRESS NOTE ADULT - PROBLEM SELECTOR PLAN 1
Patient with Hg 7.6, unclear baseline, per records 11 in 2017 likely chronic anemia given HD stability, FOCT +, however known colon malignancy   Will transfuse 1 unit as patient symptomatic with less ambulation  (goal 7-8)  Iron deficient anemia, will start iron tablets   Will continue to monitor Patient with Hg 7.6, unclear baseline, per records 11 in 2017 likely chronic anemia given HD stability, FOCT +, however known colon malignancy   S/p 1 unit pRBC with inappropriate response  Iron deficient anemia, will start iron tablets   Will continue to monitor

## 2018-05-19 NOTE — DISCHARGE NOTE ADULT - CARE PLAN
Principal Discharge DX:	Malignant neoplasm of colon, unspecified part of colon  Goal:	To follow up with [facility]osp[facility] for Home Hospice  Assessment and plan of treatment:	You have colon cancer which can cause chronic bleeding. Your blood counts are stable and you responded well to the 1 unit blood transfusion. Hospice Number: [phone]. Please call this number so we can help you set up home hospice services to make your mother comfortable.  Secondary Diagnosis:	Chronic anemia  Goal:	To follow up with [facility] for Home Hospice  Assessment and plan of treatment:	You have colon cancer which can cause chronic bleeding. Your blood counts are stable and you responded well to the 1 unit blood transfusion. Hospice Number: [phone]. Please call this number so we can help you set up home hospice services to make your mother comfortable.

## 2018-05-19 NOTE — DISCHARGE NOTE ADULT - CARE PROVIDER_API CALL
Princess Granados), Internal Medicine  [address]  [place] [address]  Phone: [phone]  Fax: [phone]

## 2018-05-19 NOTE — PROGRESS NOTE ADULT - PROBLEM SELECTOR PLAN 5
Patient with diffuse wheezing on exam, known COPD on 2L home oxygen  Duonebs standing   Will continue to monitor

## 2018-05-19 NOTE — PROGRESS NOTE ADULT - PROBLEM SELECTOR PLAN 8
DVT: SCDs given active bleed  Diet: Pureed (home)  GOC: Per family, son HCP, DNR/DNI in chart. Will sign MOLST.

## 2018-05-19 NOTE — PROGRESS NOTE ADULT - PROBLEM SELECTOR PLAN 4
Patient with demential, AOx0 at baseline, minimally verbal  Continue home medications  ISTOP [id number], Alprazolam and oxycodone 30 IR confirmed (in chart)  Currently at baseline

## 2018-05-19 NOTE — PROGRESS NOTE ADULT - PROBLEM SELECTOR PLAN 3
Patient with generalized edema to face, arms, legs to above knees 2+  No known cardiac hx, or cirrhosis however likely mets   Will give Lasix IV (home dosage 40 every other day with 20 mg)  UA ordered to evaluate protein Patient with generalized edema to face, arms, legs to above knees 2+  No known cardiac hx, or cirrhosis however likely mets   Improved with Lasix 40 IV x 1   UA pending ordered to evaluate protein

## 2018-05-19 NOTE — PROGRESS NOTE ADULT - PROBLEM SELECTOR PLAN 6
Patient with distended abdomen likely 2/2 to malignancy with mets  Nontender on exam, passing BM   Patient tolerating PO   Will defer imagining for now

## 2018-05-19 NOTE — DISCHARGE NOTE ADULT - MEDICATION SUMMARY - MEDICATIONS TO TAKE
I will START or STAY ON the medications listed below when I get home from the hospital:    aspirin 81 mg oral tablet  -- 1 tab(s) by mouth once a day  -- Indication: For Preventive measure    oxyCODONE 30 mg oral tablet  -- 1 tab(s) by mouth every 6 hours, As Needed  -- Indication: For Pain management    escitalopram 10 mg oral tablet  -- 1 tab(s) by mouth once a day  -- Indication: For Antidepressant    mirtazapine 15 mg oral tablet  -- 1 tab(s) by mouth once a day (at bedtime)  -- Indication: For Antidepressant    simvastatin 20 mg oral tablet  -- 1 tab(s) by mouth once a day (at bedtime)  -- Indication: For Hyperlipidemia    QUEtiapine 300 mg oral tablet  -- 1 tab(s) by mouth 2 times a day  -- Indication: For Alzheimer's disease    ALPRAZolam 0.5 mg oral tablet  -- 1 tab(s) by mouth 4 times a day  -- Indication: For Alzheimer's disease    ProAir HFA 90 mcg/inh inhalation aerosol  -- 2 puff(s) inhaled 4 times a day, As Needed  -- Indication: For COPD (chronic obstructive pulmonary disease)    albuterol 2.5 mg/3 mL (0.083%) inhalation solution  -- 3 milliliter(s) inhaled every 6 hours, As Needed  -- Indication: For COPD (chronic obstructive pulmonary disease)    furosemide 40 mg oral tablet  -- 1 tab(s) by mouth once a day, alternating with 0.5 tab once a day  -- Indication: For Colon cancer    docusate sodium 100 mg oral capsule  -- 1 cap(s) by mouth 2 times a day, As Needed  -- Indication: For Colon cancer    Nuedexta 20 mg-10 mg oral capsule  -- 1 cap(s) by mouth every 12 hours  -- Indication: For Alzheimer's disease    levothyroxine 100 mcg (0.1 mg) oral tablet  -- 1 tab(s) by mouth once a day  -- Indication: For hypothyroidism    Centrum Silver oral tablet  -- 1 tab(s) by mouth once a day  -- Indication: For Preventive measure

## 2018-05-19 NOTE — PROGRESS NOTE ADULT - ASSESSMENT
89F with PMHx of PMHx of dementia (AOx0), COPD (on home O2 2L), colon cancer (h/o resection, recurrence for ~ 1.5 years; family declined surgery, chemo/radiation) sent to [facility] ED for transfusion admitted with Hg 7.6 likely 2/2 to chronic anemia 2/2 to malignancy given HD stability 89F with PMHx of PMHx of dementia (AOx0), COPD (on home O2 2L), colon cancer (h/o resection, recurrence for ~ 1.5 years; family declined surgery, chemo/radiation) sent to [facility] ED for transfusion admitted with Hg 7.6 likely 2/2 to chronic anemia 2/2 to malignancy given HD stability, s/p 1 unit PRBC with inappropriate response

## 2018-06-04 PROBLEM — J44.9 CHRONIC OBSTRUCTIVE PULMONARY DISEASE, UNSPECIFIED: Chronic | Status: ACTIVE | Noted: 2018-05-18

## 2018-06-04 PROBLEM — F03.90 UNSPECIFIED DEMENTIA WITHOUT BEHAVIORAL DISTURBANCE: Chronic | Status: ACTIVE | Noted: 2018-05-18

## 2018-06-04 PROBLEM — C18.9 MALIGNANT NEOPLASM OF COLON, UNSPECIFIED: Chronic | Status: ACTIVE | Noted: 2018-05-18

## 2018-06-06 ENCOUNTER — APPOINTMENT (OUTPATIENT)
Dept: PULMONOLOGY | Facility: CLINIC | Age: 83
End: 2018-06-06
Payer: MEDICARE

## 2018-06-06 VITALS
DIASTOLIC BLOOD PRESSURE: 70 MMHG | OXYGEN SATURATION: 98 % | SYSTOLIC BLOOD PRESSURE: 130 MMHG | HEART RATE: 89 BPM | TEMPERATURE: 98.8 F

## 2018-06-06 PROCEDURE — 99214 OFFICE O/P EST MOD 30 MIN: CPT

## 2018-06-15 ENCOUNTER — RX RENEWAL (OUTPATIENT)
Age: 83
End: 2018-06-15

## 2018-08-01 ENCOUNTER — OUTPATIENT (OUTPATIENT)
Dept: OUTPATIENT SERVICES | Facility: HOSPITAL | Age: 83
LOS: 1 days | End: 2018-08-01

## 2018-08-01 ENCOUNTER — OUTPATIENT (OUTPATIENT)
Dept: OUTPATIENT SERVICES | Facility: HOSPITAL | Age: 83
LOS: 1 days | End: 2018-08-01
Payer: MEDICARE

## 2018-08-01 DIAGNOSIS — Z90.49 ACQUIRED ABSENCE OF OTHER SPECIFIED PARTS OF DIGESTIVE TRACT: Chronic | ICD-10-CM

## 2018-08-01 PROCEDURE — G9001: CPT

## 2018-08-07 ENCOUNTER — MESSAGE (OUTPATIENT)
Age: 83
End: 2018-08-07

## 2018-08-08 ENCOUNTER — INPATIENT (INPATIENT)
Facility: HOSPITAL | Age: 83
LOS: 0 days | Discharge: HOME | End: 2018-08-09
Attending: INTERNAL MEDICINE | Admitting: INTERNAL MEDICINE
Payer: MEDICARE

## 2018-08-08 ENCOUNTER — TRANSCRIPTION ENCOUNTER (OUTPATIENT)
Age: 83
End: 2018-08-08

## 2018-08-08 VITALS
DIASTOLIC BLOOD PRESSURE: 52 MMHG | TEMPERATURE: 98 F | RESPIRATION RATE: 18 BRPM | HEART RATE: 87 BPM | SYSTOLIC BLOOD PRESSURE: 112 MMHG | OXYGEN SATURATION: 95 %

## 2018-08-08 DIAGNOSIS — C18.9 MALIGNANT NEOPLASM OF COLON, UNSPECIFIED: ICD-10-CM

## 2018-08-08 DIAGNOSIS — Z29.9 ENCOUNTER FOR PROPHYLACTIC MEASURES, UNSPECIFIED: ICD-10-CM

## 2018-08-08 DIAGNOSIS — N30.00 ACUTE CYSTITIS WITHOUT HEMATURIA: ICD-10-CM

## 2018-08-08 DIAGNOSIS — Z90.49 ACQUIRED ABSENCE OF OTHER SPECIFIED PARTS OF DIGESTIVE TRACT: Chronic | ICD-10-CM

## 2018-08-08 DIAGNOSIS — R53.2 FUNCTIONAL QUADRIPLEGIA: ICD-10-CM

## 2018-08-08 DIAGNOSIS — F03.90 UNSPECIFIED DEMENTIA WITHOUT BEHAVIORAL DISTURBANCE: ICD-10-CM

## 2018-08-08 DIAGNOSIS — D50.0 IRON DEFICIENCY ANEMIA SECONDARY TO BLOOD LOSS (CHRONIC): ICD-10-CM

## 2018-08-08 DIAGNOSIS — Z71.89 OTHER SPECIFIED COUNSELING: ICD-10-CM

## 2018-08-08 DIAGNOSIS — D64.9 ANEMIA, UNSPECIFIED: ICD-10-CM

## 2018-08-08 DIAGNOSIS — J44.9 CHRONIC OBSTRUCTIVE PULMONARY DISEASE, UNSPECIFIED: ICD-10-CM

## 2018-08-08 DIAGNOSIS — J96.11 CHRONIC RESPIRATORY FAILURE WITH HYPOXIA: ICD-10-CM

## 2018-08-08 LAB
ALBUMIN SERPL ELPH-MCNC: 2.9 G/DL — LOW (ref 3.3–5)
ALP SERPL-CCNC: 56 U/L — SIGNIFICANT CHANGE UP (ref 40–120)
ALT FLD-CCNC: 14 U/L — SIGNIFICANT CHANGE UP (ref 4–33)
AMORPH CRY # UR COMP ASSIST: SIGNIFICANT CHANGE UP (ref 0–0)
APPEARANCE UR: SIGNIFICANT CHANGE UP
APTT BLD: 25.8 SEC — LOW (ref 27.5–37.4)
AST SERPL-CCNC: 45 U/L — HIGH (ref 4–32)
BACTERIA # UR AUTO: SIGNIFICANT CHANGE UP
BASOPHILS # BLD AUTO: 0.01 K/UL — SIGNIFICANT CHANGE UP (ref 0–0.2)
BASOPHILS NFR BLD AUTO: 0.1 % — SIGNIFICANT CHANGE UP (ref 0–2)
BILIRUB SERPL-MCNC: 0.2 MG/DL — SIGNIFICANT CHANGE UP (ref 0.2–1.2)
BILIRUB UR-MCNC: NEGATIVE — SIGNIFICANT CHANGE UP
BLD GP AB SCN SERPL QL: NEGATIVE — SIGNIFICANT CHANGE UP
BLOOD UR QL VISUAL: NEGATIVE — SIGNIFICANT CHANGE UP
BUN SERPL-MCNC: 21 MG/DL — SIGNIFICANT CHANGE UP (ref 7–23)
BUN SERPL-MCNC: 22 MG/DL — SIGNIFICANT CHANGE UP (ref 7–23)
CALCIUM SERPL-MCNC: 9.1 MG/DL — SIGNIFICANT CHANGE UP (ref 8.4–10.5)
CALCIUM SERPL-MCNC: 9.4 MG/DL — SIGNIFICANT CHANGE UP (ref 8.4–10.5)
CHLORIDE SERPL-SCNC: 88 MMOL/L — LOW (ref 98–107)
CHLORIDE SERPL-SCNC: 92 MMOL/L — LOW (ref 98–107)
CO2 SERPL-SCNC: 40 MMOL/L — HIGH (ref 22–31)
CO2 SERPL-SCNC: 44 MMOL/L — HIGH (ref 22–31)
COLOR SPEC: SIGNIFICANT CHANGE UP
CREAT SERPL-MCNC: 1.47 MG/DL — HIGH (ref 0.5–1.3)
CREAT SERPL-MCNC: 1.47 MG/DL — HIGH (ref 0.5–1.3)
EOSINOPHIL # BLD AUTO: 0.24 K/UL — SIGNIFICANT CHANGE UP (ref 0–0.5)
EOSINOPHIL NFR BLD AUTO: 2.8 % — SIGNIFICANT CHANGE UP (ref 0–6)
GLUCOSE SERPL-MCNC: 124 MG/DL — HIGH (ref 70–99)
GLUCOSE SERPL-MCNC: 183 MG/DL — HIGH (ref 70–99)
GLUCOSE UR-MCNC: NEGATIVE — SIGNIFICANT CHANGE UP
HCT VFR BLD CALC: 27.8 % — LOW (ref 34.5–45)
HCT VFR BLD CALC: 31 % — LOW (ref 34.5–45)
HGB BLD-MCNC: 7.7 G/DL — LOW (ref 11.5–15.5)
HGB BLD-MCNC: 8.7 G/DL — LOW (ref 11.5–15.5)
HYALINE CASTS # UR AUTO: SIGNIFICANT CHANGE UP (ref 0–?)
IMM GRANULOCYTES # BLD AUTO: 0.04 # — SIGNIFICANT CHANGE UP
IMM GRANULOCYTES NFR BLD AUTO: 0.5 % — SIGNIFICANT CHANGE UP (ref 0–1.5)
INR BLD: 1.06 — SIGNIFICANT CHANGE UP (ref 0.88–1.17)
KETONES UR-MCNC: NEGATIVE — SIGNIFICANT CHANGE UP
LEUKOCYTE ESTERASE UR-ACNC: HIGH
LYMPHOCYTES # BLD AUTO: 1.33 K/UL — SIGNIFICANT CHANGE UP (ref 1–3.3)
LYMPHOCYTES # BLD AUTO: 15.5 % — SIGNIFICANT CHANGE UP (ref 13–44)
MCHC RBC-ENTMCNC: 23.4 PG — LOW (ref 27–34)
MCHC RBC-ENTMCNC: 24.2 PG — LOW (ref 27–34)
MCHC RBC-ENTMCNC: 27.7 % — LOW (ref 32–36)
MCHC RBC-ENTMCNC: 28.1 % — LOW (ref 32–36)
MCV RBC AUTO: 84.5 FL — SIGNIFICANT CHANGE UP (ref 80–100)
MCV RBC AUTO: 86.4 FL — SIGNIFICANT CHANGE UP (ref 80–100)
MONOCYTES # BLD AUTO: 0.55 K/UL — SIGNIFICANT CHANGE UP (ref 0–0.9)
MONOCYTES NFR BLD AUTO: 6.4 % — SIGNIFICANT CHANGE UP (ref 2–14)
MUCOUS THREADS # UR AUTO: SIGNIFICANT CHANGE UP
NEUTROPHILS # BLD AUTO: 6.42 K/UL — SIGNIFICANT CHANGE UP (ref 1.8–7.4)
NEUTROPHILS NFR BLD AUTO: 74.7 % — SIGNIFICANT CHANGE UP (ref 43–77)
NITRITE UR-MCNC: NEGATIVE — SIGNIFICANT CHANGE UP
NRBC # FLD: 0 — SIGNIFICANT CHANGE UP
NRBC # FLD: 0 — SIGNIFICANT CHANGE UP
PH UR: 7 — SIGNIFICANT CHANGE UP (ref 4.6–8)
PLATELET # BLD AUTO: 159 K/UL — SIGNIFICANT CHANGE UP (ref 150–400)
PLATELET # BLD AUTO: 168 K/UL — SIGNIFICANT CHANGE UP (ref 150–400)
PMV BLD: 10 FL — SIGNIFICANT CHANGE UP (ref 7–13)
PMV BLD: 10.1 FL — SIGNIFICANT CHANGE UP (ref 7–13)
POTASSIUM SERPL-MCNC: 4.3 MMOL/L — SIGNIFICANT CHANGE UP (ref 3.5–5.3)
POTASSIUM SERPL-MCNC: SIGNIFICANT CHANGE UP MMOL/L (ref 3.5–5.3)
POTASSIUM SERPL-SCNC: 4.3 MMOL/L — SIGNIFICANT CHANGE UP (ref 3.5–5.3)
POTASSIUM SERPL-SCNC: SIGNIFICANT CHANGE UP MMOL/L (ref 3.5–5.3)
PROT SERPL-MCNC: 8.3 G/DL — SIGNIFICANT CHANGE UP (ref 6–8.3)
PROT UR-MCNC: 20 MG/DL — SIGNIFICANT CHANGE UP
PROTHROM AB SERPL-ACNC: 11.8 SEC — SIGNIFICANT CHANGE UP (ref 9.8–13.1)
RBC # BLD: 3.29 M/UL — LOW (ref 3.8–5.2)
RBC # BLD: 3.59 M/UL — LOW (ref 3.8–5.2)
RBC # FLD: 19.7 % — HIGH (ref 10.3–14.5)
RBC # FLD: 21.2 % — HIGH (ref 10.3–14.5)
RBC CASTS # UR COMP ASSIST: SIGNIFICANT CHANGE UP (ref 0–?)
RH IG SCN BLD-IMP: POSITIVE — SIGNIFICANT CHANGE UP
SODIUM SERPL-SCNC: 136 MMOL/L — SIGNIFICANT CHANGE UP (ref 135–145)
SODIUM SERPL-SCNC: 139 MMOL/L — SIGNIFICANT CHANGE UP (ref 135–145)
SP GR SPEC: 1.01 — SIGNIFICANT CHANGE UP (ref 1–1.04)
SQUAMOUS # UR AUTO: SIGNIFICANT CHANGE UP
UROBILINOGEN FLD QL: NORMAL MG/DL — SIGNIFICANT CHANGE UP
WBC # BLD: 8.01 K/UL — SIGNIFICANT CHANGE UP (ref 3.8–10.5)
WBC # BLD: 8.59 K/UL — SIGNIFICANT CHANGE UP (ref 3.8–10.5)
WBC # FLD AUTO: 8.01 K/UL — SIGNIFICANT CHANGE UP (ref 3.8–10.5)
WBC # FLD AUTO: 8.59 K/UL — SIGNIFICANT CHANGE UP (ref 3.8–10.5)
WBC CLUMPS #/AREA URNS HPF: PRESENT — HIGH (ref 0–?)
WBC UR QL: SIGNIFICANT CHANGE UP (ref 0–?)

## 2018-08-08 PROCEDURE — 99223 1ST HOSP IP/OBS HIGH 75: CPT

## 2018-08-08 RX ORDER — DOCUSATE SODIUM 100 MG
1 CAPSULE ORAL
Qty: 0 | Refills: 0 | COMMUNITY

## 2018-08-08 RX ORDER — DOCUSATE SODIUM 100 MG
100 CAPSULE ORAL
Qty: 0 | Refills: 0 | Status: DISCONTINUED | OUTPATIENT
Start: 2018-08-08 | End: 2018-08-09

## 2018-08-08 RX ORDER — POLYETHYLENE GLYCOL 3350 17 G/17G
17 POWDER, FOR SOLUTION ORAL DAILY
Qty: 0 | Refills: 0 | Status: DISCONTINUED | OUTPATIENT
Start: 2018-08-08 | End: 2018-08-09

## 2018-08-08 RX ORDER — OXYCODONE HYDROCHLORIDE 5 MG/1
15 TABLET ORAL ONCE
Qty: 0 | Refills: 0 | Status: DISCONTINUED | OUTPATIENT
Start: 2018-08-08 | End: 2018-08-08

## 2018-08-08 RX ORDER — SIMVASTATIN 20 MG/1
1 TABLET, FILM COATED ORAL
Qty: 0 | Refills: 0 | COMMUNITY

## 2018-08-08 RX ORDER — FUROSEMIDE 40 MG
1 TABLET ORAL
Qty: 0 | Refills: 0 | COMMUNITY

## 2018-08-08 RX ORDER — MULTIVIT-MIN/FERROUS GLUCONATE 9 MG/15 ML
1 LIQUID (ML) ORAL DAILY
Qty: 0 | Refills: 0 | Status: DISCONTINUED | OUTPATIENT
Start: 2018-08-08 | End: 2018-08-08

## 2018-08-08 RX ORDER — ACETAMINOPHEN 500 MG
650 TABLET ORAL EVERY 6 HOURS
Qty: 0 | Refills: 0 | Status: DISCONTINUED | OUTPATIENT
Start: 2018-08-08 | End: 2018-08-09

## 2018-08-08 RX ORDER — CIPROFLOXACIN LACTATE 400MG/40ML
250 VIAL (ML) INTRAVENOUS
Qty: 0 | Refills: 0 | Status: DISCONTINUED | OUTPATIENT
Start: 2018-08-08 | End: 2018-08-09

## 2018-08-08 RX ORDER — MIRTAZAPINE 45 MG/1
15 TABLET, ORALLY DISINTEGRATING ORAL AT BEDTIME
Qty: 0 | Refills: 0 | Status: DISCONTINUED | OUTPATIENT
Start: 2018-08-08 | End: 2018-08-09

## 2018-08-08 RX ORDER — FUROSEMIDE 40 MG
40 TABLET ORAL DAILY
Qty: 0 | Refills: 0 | Status: DISCONTINUED | OUTPATIENT
Start: 2018-08-09 | End: 2018-08-09

## 2018-08-08 RX ORDER — OXYCODONE HYDROCHLORIDE 5 MG/1
1 TABLET ORAL
Qty: 0 | Refills: 0 | COMMUNITY

## 2018-08-08 RX ORDER — OXYCODONE HYDROCHLORIDE 5 MG/1
30 TABLET ORAL EVERY 4 HOURS
Qty: 0 | Refills: 0 | Status: DISCONTINUED | OUTPATIENT
Start: 2018-08-08 | End: 2018-08-09

## 2018-08-08 RX ORDER — POLYETHYLENE GLYCOL 3350 17 G/17G
17 POWDER, FOR SOLUTION ORAL
Qty: 0 | Refills: 0 | COMMUNITY

## 2018-08-08 RX ORDER — ESCITALOPRAM OXALATE 10 MG/1
1 TABLET, FILM COATED ORAL
Qty: 0 | Refills: 0 | COMMUNITY

## 2018-08-08 RX ORDER — MULTIVIT-MIN/FERROUS GLUCONATE 9 MG/15 ML
1 LIQUID (ML) ORAL
Qty: 0 | Refills: 0 | COMMUNITY

## 2018-08-08 RX ORDER — CIPROFLOXACIN LACTATE 400MG/40ML
1 VIAL (ML) INTRAVENOUS
Qty: 4 | Refills: 0 | OUTPATIENT
Start: 2018-08-08

## 2018-08-08 RX ORDER — LEVOTHYROXINE SODIUM 125 MCG
1 TABLET ORAL
Qty: 0 | Refills: 0 | COMMUNITY

## 2018-08-08 RX ORDER — SIMVASTATIN 20 MG/1
1 TABLET, FILM COATED ORAL
Qty: 0 | Refills: 0 | COMMUNITY
Start: 2018-08-08

## 2018-08-08 RX ORDER — CIPROFLOXACIN LACTATE 400MG/40ML
400 VIAL (ML) INTRAVENOUS ONCE
Qty: 0 | Refills: 0 | Status: COMPLETED | OUTPATIENT
Start: 2018-08-08 | End: 2018-08-08

## 2018-08-08 RX ORDER — FUROSEMIDE 40 MG
20 TABLET ORAL ONCE
Qty: 0 | Refills: 0 | Status: COMPLETED | OUTPATIENT
Start: 2018-08-08 | End: 2018-08-08

## 2018-08-08 RX ORDER — ESCITALOPRAM OXALATE 10 MG/1
10 TABLET, FILM COATED ORAL DAILY
Qty: 0 | Refills: 0 | Status: DISCONTINUED | OUTPATIENT
Start: 2018-08-08 | End: 2018-08-09

## 2018-08-08 RX ORDER — SIMVASTATIN 20 MG/1
20 TABLET, FILM COATED ORAL AT BEDTIME
Qty: 0 | Refills: 0 | Status: DISCONTINUED | OUTPATIENT
Start: 2018-08-08 | End: 2018-08-09

## 2018-08-08 RX ORDER — QUETIAPINE FUMARATE 200 MG/1
300 TABLET, FILM COATED ORAL
Qty: 0 | Refills: 0 | Status: DISCONTINUED | OUTPATIENT
Start: 2018-08-08 | End: 2018-08-09

## 2018-08-08 RX ORDER — ALPRAZOLAM 0.25 MG
0.5 TABLET ORAL
Qty: 0 | Refills: 0 | Status: DISCONTINUED | OUTPATIENT
Start: 2018-08-08 | End: 2018-08-09

## 2018-08-08 RX ORDER — ALBUTEROL 90 UG/1
2.5 AEROSOL, METERED ORAL EVERY 6 HOURS
Qty: 0 | Refills: 0 | Status: DISCONTINUED | OUTPATIENT
Start: 2018-08-08 | End: 2018-08-09

## 2018-08-08 RX ORDER — SODIUM CHLORIDE 9 MG/ML
1000 INJECTION INTRAMUSCULAR; INTRAVENOUS; SUBCUTANEOUS ONCE
Qty: 0 | Refills: 0 | Status: COMPLETED | OUTPATIENT
Start: 2018-08-08 | End: 2018-08-08

## 2018-08-08 RX ORDER — LORATADINE 10 MG/1
1 TABLET ORAL
Qty: 0 | Refills: 0 | COMMUNITY

## 2018-08-08 RX ORDER — ALBUTEROL 90 UG/1
3 AEROSOL, METERED ORAL
Qty: 0 | Refills: 0 | COMMUNITY

## 2018-08-08 RX ORDER — MIRTAZAPINE 45 MG/1
1 TABLET, ORALLY DISINTEGRATING ORAL
Qty: 0 | Refills: 0 | COMMUNITY

## 2018-08-08 RX ORDER — ALPRAZOLAM 0.25 MG
1 TABLET ORAL
Qty: 0 | Refills: 0 | COMMUNITY

## 2018-08-08 RX ORDER — QUETIAPINE FUMARATE 200 MG/1
1 TABLET, FILM COATED ORAL
Qty: 0 | Refills: 0 | COMMUNITY

## 2018-08-08 RX ORDER — ALBUTEROL 90 UG/1
2 AEROSOL, METERED ORAL
Qty: 0 | Refills: 0 | COMMUNITY

## 2018-08-08 RX ORDER — ASPIRIN/CALCIUM CARB/MAGNESIUM 324 MG
1 TABLET ORAL
Qty: 0 | Refills: 0 | COMMUNITY

## 2018-08-08 RX ORDER — FERROUS SULFATE 325(65) MG
1 TABLET ORAL
Qty: 0 | Refills: 0 | COMMUNITY

## 2018-08-08 RX ORDER — LEVOTHYROXINE SODIUM 125 MCG
100 TABLET ORAL DAILY
Qty: 0 | Refills: 0 | Status: DISCONTINUED | OUTPATIENT
Start: 2018-08-08 | End: 2018-08-09

## 2018-08-08 RX ORDER — FERROUS SULFATE 325(65) MG
325 TABLET ORAL DAILY
Qty: 0 | Refills: 0 | Status: DISCONTINUED | OUTPATIENT
Start: 2018-08-08 | End: 2018-08-09

## 2018-08-08 RX ADMIN — SIMVASTATIN 20 MILLIGRAM(S): 20 TABLET, FILM COATED ORAL at 23:14

## 2018-08-08 RX ADMIN — MIRTAZAPINE 15 MILLIGRAM(S): 45 TABLET, ORALLY DISINTEGRATING ORAL at 23:14

## 2018-08-08 RX ADMIN — Medication 200 MILLIGRAM(S): at 12:40

## 2018-08-08 RX ADMIN — Medication 400 MILLIGRAM(S): at 12:40

## 2018-08-08 RX ADMIN — SODIUM CHLORIDE 1000 MILLILITER(S): 9 INJECTION INTRAMUSCULAR; INTRAVENOUS; SUBCUTANEOUS at 12:40

## 2018-08-08 RX ADMIN — OXYCODONE HYDROCHLORIDE 15 MILLIGRAM(S): 5 TABLET ORAL at 14:03

## 2018-08-08 RX ADMIN — QUETIAPINE FUMARATE 300 MILLIGRAM(S): 200 TABLET, FILM COATED ORAL at 19:58

## 2018-08-08 RX ADMIN — Medication 325 MILLIGRAM(S): at 18:59

## 2018-08-08 RX ADMIN — Medication 250 MILLIGRAM(S): at 18:59

## 2018-08-08 RX ADMIN — ESCITALOPRAM OXALATE 10 MILLIGRAM(S): 10 TABLET, FILM COATED ORAL at 19:00

## 2018-08-08 RX ADMIN — OXYCODONE HYDROCHLORIDE 15 MILLIGRAM(S): 5 TABLET ORAL at 14:33

## 2018-08-08 RX ADMIN — Medication 1 TABLET(S): at 18:59

## 2018-08-08 RX ADMIN — Medication 20 MILLIGRAM(S): at 16:48

## 2018-08-08 NOTE — ED PROVIDER NOTE - OBJECTIVE STATEMENT
88 Y/O F PMH colorectal Cancer, anemia requiring blood transfusion, COPD, Dementia S/P colon resection referred by Dr. Lockett for transfusion. Patient has frequent rectal bleeding due to malignancy who last bled 2 days ago, no active bleeding. Patient's caregiver states that patient is at her baseline, no other changes such as aggression or crying in pain. Patient 88 Y/O F PMH colorectal Cancer, anemia requiring blood transfusion, COPD, Dementia S/P colon resection referred by Dr. Lockett for transfusion. Patient has frequent rectal bleeding due to malignancy who last bled 2 days ago, no active bleeding. Patient's caregiver states that patient is at her baseline, no other changes such as aggression or crying in pain. Patient also has a UTI. Patient only occasionally verbal, has not spoken to caregivers in ED. Spoke with patient's son Blas Ordonez  who is patient's son, authorized blood transfusion. No other acute changes.

## 2018-08-08 NOTE — ED PROVIDER NOTE - PHYSICAL EXAMINATION
Patient intermittently verbal, no acute distress, no long Patient intermittently verbal, no acute distress,   VS:  unremarkable    GEN - NAD; malaise, pallor A+O x1   HEAD - NC/AT     ENT - PEERL, EOMI, mucous membranes  dry , no discharge      NECK: Neck supple, non-tender without lymphadenopathy, no masses, no JVD  PULM - CTA b/l,  symmetric breath sounds  COR -  normal heart sounds    ABD - , ND, NT, soft,  BACK - no CVA tenderness, nontender spine     EXTREMS - no edema, no deformity, warm and well perfused    SKIN - no rash or bruising      NEUROLOGIC - alert, CN 2-12 intact, sensation nl, motor no focal deficit.

## 2018-08-08 NOTE — DISCHARGE NOTE ADULT - CARE PLAN
Principal Discharge DX:	Anemia  Goal:	Followup with PMD and take all medications prescribed.  Assessment and plan of treatment:	Followup with PMD and take all medications prescribed. Low salt, low fat, low cholesterol diet  Secondary Diagnosis:	Chronic respiratory failure with hypoxia and hypercapnia  Goal:	Followup with PMD and take all medications prescribed.  Assessment and plan of treatment:	Followup with PMD and take all medications prescribed. Low salt, low fat, low cholesterol diet  Secondary Diagnosis:	COPD (chronic obstructive pulmonary disease)  Goal:	Followup with PMD and take all medications prescribed.  Assessment and plan of treatment:	Followup with PMD and take all medications prescribed. Low salt, low fat, low cholesterol diet

## 2018-08-08 NOTE — ED ADULT NURSE NOTE - OBJECTIVE STATEMENT
Patient presenting in ED for low hemoglobin and evaluation for blood transfusion, patient is on 4 L/min nasal canula at home

## 2018-08-08 NOTE — H&P ADULT - PROBLEM SELECTOR PLAN 2
-Patient following with [facility]  -Son no longer interested in disease modifying therapy. Wants patient to be comfortable, but still wants transfusions

## 2018-08-08 NOTE — DISCHARGE NOTE ADULT - HOSPITAL COURSE
AT ADMISSION: AT ADMISSION: Ms. Ordonez is a 89 yo F with chronic hypoxic and hypercapenic respiratory failure 2/2 COPD (on 4L home O2), colon CA, dementia (AOx0 at baseline, minimally verbal), who presents to the [facility] ED after outpatient lab work showed an H/H of 7.7. Patient not presently answering questions, but per aide at bedside, she reports patient has been having intermittent rectal bleeding from her CA since last week. There was some blood yesterday so labs were drawn. Labs were sent to Dr. James's office who advised her to come to the ED for a transfusion. H/H is at baseline of 7.7, but oncology requesting transfusion as patient still intermittently with active bleeding. Per HHA and son at bedside, the patient does not appear to be more SOB than at baseline today than before. They do, however, report, that patient's O2 requirement was recently increased from 2 to 4L home O2 due to some increased WOB at home. The aide does note that the patient's functional status has declined over the past 2 weeks. She notes that up until about two weeks ago, patient was speaking more and was occasionally able to get up and ambulate with a walker. Since then she has been more quiet, and has not been able to get out of bed.     Of note, on past admission, patient was discharged with home hospice. The son dismissed hospice approximately one month ago as patient was prescribed a Fentanyl patch 2/2 pain. Patient subsequently became obtunded. This resolved with removal of the patch. Given his dissatisfaction, he is still not interested in hospice care. The son has two aides 12hrs/day x 7 days a week. He and his wife take care of patient otherwise. He reports he has made her room a "mini-hospital." He wants to take patient home upon completion of transfusion.     Hospital Course: Patient received 1 unit of PRBC. Son wished to take the patient home without any additional services. AT ADMISSION: Ms. Ordonez is a 88 yo F with chronic hypoxic and hypercapenic respiratory failure 2/2 COPD (on 4L home O2), colon CA, dementia (AOx0 at baseline, minimally verbal), who presents to the [facility] ED after outpatient lab work showed an H/H of 7.7. Patient not presently answering questions, but per aide at bedside, she reports patient has been having intermittent rectal bleeding from her CA since last week. There was some blood yesterday so labs were drawn. Labs were sent to Dr. James's office who advised her to come to the ED for a transfusion. H/H is at baseline of 7.7, but oncology requesting transfusion as patient still intermittently with active bleeding. Per HHA and son at bedside, the patient does not appear to be more SOB than at baseline today than before. They do, however, report, that patient's O2 requirement was recently increased from 2 to 4L home O2 due to some increased WOB at home. The aide does note that the patient's functional status has declined over the past 2 weeks. She notes that up until about two weeks ago, patient was speaking more and was occasionally able to get up and ambulate with a walker. Since then she has been more quiet, and has not been able to get out of bed.     Of note, on past admission, patient was discharged with home hospice. The son dismissed hospice approximately one month ago as patient was prescribed a Fentanyl patch 2/2 pain. Patient subsequently became obtunded. This resolved with removal of the patch. Given his dissatisfaction, he is still not interested in hospice care. The son has two aides 12hrs/day x 7 days a week. He and his wife take care of patient otherwise. He reports he has made her room a "mini-hospital." He wants to take patient home upon completion of transfusion.     Hospital Course: Patient received 1 unit of PRBC. Son wished to take the patient home without any additional services.  As per attending, patient stable for discharge. AT ADMISSION: Ms. Ordonez is a 89 yo F with chronic hypoxic and hypercapenic respiratory failure 2/2 COPD (on 4L home O2), colon CA, dementia (AOx0 at baseline, minimally verbal), who presents to the [facility] ED after outpatient lab work showed an H/H of 7.7. Patient not presently answering questions, but per aide at bedside, she reports patient has been having intermittent rectal bleeding from her CA since last week. There was some blood yesterday so labs were drawn. Labs were sent to Dr. James's office who advised her to come to the ED for a transfusion. H/H is at baseline of 7.7, but oncology requesting transfusion as patient still intermittently with active bleeding. Per HHA and son at bedside, the patient does not appear to be more SOB than at baseline today than before. They do, however, report, that patient's O2 requirement was recently increased from 2 to 4L home O2 due to some increased WOB at home. The aide does note that the patient's functional status has declined over the past 2 weeks. She notes that up until about two weeks ago, patient was speaking more and was occasionally able to get up and ambulate with a walker. Since then she has been more quiet, and has not been able to get out of bed.     Of note, on past admission, patient was discharged with home hospice. The son dismissed hospice approximately one month ago as patient was prescribed a Fentanyl patch 2/2 pain. Patient subsequently became obtunded. This resolved with removal of the patch. Given his dissatisfaction, he is still not interested in hospice care. The son has two aides 12hrs/day x 7 days a week. He and his wife take care of patient otherwise. He reports he has made her room a "mini-hospital." He wants to take patient home upon completion of transfusion.     Hospital Course: Patient received 1 unit of PRBC. Son wished to take the patient home without any additional services.  As per attending, patient stable for discharge at this time.

## 2018-08-08 NOTE — ED ADULT NURSE REASSESSMENT NOTE - NS ED NURSE REASSESS COMMENT FT1
Hgb/Hct=7.7/27.8, patient need blood transfusion per attending Dr. Dawson, first unit of blood transfusion red blood cell initiated by BRIAN Vitale, verified by BRIAN Colón , blood transfusion protocol applied per hospital policy

## 2018-08-08 NOTE — ED ADULT TRIAGE NOTE - CHIEF COMPLAINT QUOTE
pt sent in by oncologist for hemoglobin 7.g. sent in for blood transfusion. reports bright red blood per rectum. hx of colorectal ca not currently on chemo.   02 dependant -4l n/c.

## 2018-08-08 NOTE — H&P ADULT - NSHPPHYSICALEXAM_GEN_ALL_CORE
Constitutional: Lying in bed comfortably. No acute distress, non-verbal at this time  HEENT: pupils unequal, but reactive, clear conjunctiva  Neck: Supple: no JVD  Respiratory: diffuse expiratory wheezing bilaterally, +rales  Cardiovascular: S1 & S2 with RRR; no murmurs,  Gastrointestinal: distended, slightly firm, bowel sounds appreciated, nontender  Extremities: Trace LE edema  Vascular: Radial Pulse: right normal; left normal  Neurological: alert and oriented x 0, minimally verbal  Skin: warm and dry, no decubiti  Psychiatric: AOx0, at baseline per family

## 2018-08-08 NOTE — H&P ADULT - NSHPSOCIALHISTORY_GEN_ALL_CORE
never smoker, no ETOH use, lives with son and daughter in law, minimally verbal, 2 person assist, has HHA for 12 hrs/day x 7 days. Son spends time with patient at night

## 2018-08-08 NOTE — H&P ADULT - ATTENDING COMMENTS
Patient to receive 1U PRBC. Lungs with some crackles when I listened to them on admission. Will give Lasix 20mg IVx1 now as has received IVF in ED. If patient tolerates transfusion, would re-evaluate lungs. If no signs of volume overload, would dc home with Lasix 40mg po Qdaily today and tomorrow. Thereafter, can go back to patient's home Lasix schedule of alternating 20mg daily with 40mg daily. Will need to contact SW in ED to arrange for transport home. Patient to receive 1U PRBC. Lungs with some crackles when I listened to them on admission. Will give Lasix 20mg IVx1 now as has received IVF in ED. If patient tolerates transfusion, would re-evaluate lungs. If no signs of volume overload, would dc home with Lasix 40mg po Qdaily today and tomorrow. Thereafter, can go back to patient's home Lasix schedule of alternating 20mg daily with 40mg daily. Will need to contact SW in ED to arrange for transport home if ok for dc.

## 2018-08-08 NOTE — ED PROVIDER NOTE - MEDICAL DECISION MAKING DETAILS
89 Y/O F PMH colorectal Cancer, anemia requiring blood transfusion, COPD, Dementia S/P colon resection referred by Dr. Lockett for transfusion. Pt to be admitted for transfusion, repeat labs and UTI treatment. Patient is stable at this time.

## 2018-08-08 NOTE — H&P ADULT - PROBLEM SELECTOR PLAN 7
-I spoke face-to-face at bedside with patient's son and HCP, Blas Ordonez. He is aware of patient's overall decline. He is amenable to DNR/DNI status. He still wants transfusions and antibiotics if needed. His overall goal though is for his mother to be comfortable and to pass at home. He is not interested in hospice care at this time, but if she becomes more symptomatic in the future would be willing to look into it again. TIME SPENT FACE TO FACE: 22mins

## 2018-08-08 NOTE — ED PROVIDER NOTE - CPE EDP RESP NORM
normal... -infrarenal, 4.7 cm x 4.1 cm diameter. Per chart review noted to be unchanged since 12/28 per radiology  - will continue to monitor closely   -BP control

## 2018-08-08 NOTE — H&P ADULT - NSHPLABSRESULTS_GEN_ALL_CORE
ALL LABS AND IMAGING PERSONALLY REVIEWED:                          7.7    8.59  )-----------( 168      ( 08 Aug 2018 11:04 )             27.8     08-08    139  |  92<L>  |  21  ----------------------------<  124<H>  4.3   |  40<H>  |  1.47<H>    Ca    9.1      08 Aug 2018 13:25    TPro  8.3  /  Alb  2.9<L>  /  TBili  0.2  /  DBili  x   /  AST  45<H>  /  ALT  14  /  AlkPhos  56  08-08 ALL LABS AND IMAGING PERSONALLY REVIEWED:                          7.7    8.59  )-----------( 168      ( 08 Aug 2018 11:04 )             27.8     08-08    139  |  92<L>  |  21  ----------------------------<  124<H>  4.3   |  40<H>  |  1.47<H>    Ca    9.1      08 Aug 2018 13:25    TPro  8.3  /  Alb  2.9<L>  /  TBili  0.2  /  DBili  x   /  AST  45<H>  /  ALT  14  /  AlkPhos  56  08-08    Urinalysis (08.08.18 @ 11:04)    Color: PLYEL    Urine Appearance: HAZY    Glucose: NEGATIVE    Bilirubin: NEGATIVE    Ketone - Urine: NEGATIVE    Specific Gravity: 1.013    Blood: NEGATIVE    pH - Urine: 7.0    Protein, Urine: 20 mg/dL    Urobilinogen: NORMAL mg/dL    Nitrite: NEGATIVE    Leukocyte Esterase Concentration: LARGE    Red Blood Cell - Urine: 0-2    White Blood Cell - Urine: 25-50    Hyaline Casts: 2-5    Mucus: FEW    White Blood Cell Clump: PRESENT    Bacteria: MOD    Squamous Epithelial: OCC    Amorphous Crystal: FEW

## 2018-08-08 NOTE — DISCHARGE NOTE ADULT - PATIENT PORTAL LINK FT
You can access the [facility] Patient Portal, offered by [facility], by registering with the following website: [email or web address]

## 2018-08-08 NOTE — H&P ADULT - PROBLEM SELECTOR PLAN 1
-Patient's H/H overall at baseline, but oncology requesting 1U PRBC at this time given chronic bleeding 2/2 rectal CA  -Patient currently receiving transfusion, already received 1L NS, will give one dose of IV Lasix now as has some crackles on exam at bases. Will reassess lungs after to determine if further diuresis is needed  -Son wants to take patient home if transfusion tolerated. If tolerated, will contact ED SW to assist in arranging transportation home

## 2018-08-08 NOTE — ED PROVIDER NOTE - PROGRESS NOTE DETAILS
LAVON Jane: Spoke with oncology fellow who states patient was sent for transfusion. Oncology fellow reviewed outpatient labs.

## 2018-08-08 NOTE — H&P ADULT - HISTORY OF PRESENT ILLNESS
Ms. Ordonez is a 88 yo F with chronic hypoxic and hypercapenic respiratory failure 2/2 COPD (on 4L home O2), colon CA, dementia (AOx0 at baseline, minimally verbal), who presents to the [facility] ED after outpatient lab work showed an H/H of 7.7. Patient not presently answering questions, but per aide at bedside, she reports patient has been having intermittent rectal bleeding from her CA since last week. There was some blood yesterday so labs were drawn. Labs were sent to Dr. James's office who advised her to come to the ED for a transfusion. H/H is at baseline of 7.7, but oncology requesting transfusion as patient still intermittently with active bleeding. Per HHA and son at bedside, the patient does not appear to be more SOB than at baseline today than before. They do, however, report, that patient's O2 requirement was recently increased from 2 to 4L home O2 due to some increased WOB at home. The aide does note that the patient's functional status has declined over the past 2 weeks. She notes that up until about two weeks ago, patient was speaking more and was occasionally able to get up and ambulate with a walker. Since then she has been more quiet, and has not been able to get out of bed.     In the ED: 98.7, 72, 118/55, 18, 100% 4L NC Ms. Ordonez is a 88 yo F with chronic hypoxic and hypercapenic respiratory failure 2/2 COPD (on 4L home O2), colon CA, dementia (AOx0 at baseline, minimally verbal), who presents to the [facility] ED after outpatient lab work showed an H/H of 7.7. Patient not presently answering questions, but per aide at bedside, she reports patient has been having intermittent rectal bleeding from her CA since last week. There was some blood yesterday so labs were drawn. Labs were sent to Dr. James's office who advised her to come to the ED for a transfusion. H/H is at baseline of 7.7, but oncology requesting transfusion as patient still intermittently with active bleeding. Per HHA and son at bedside, the patient does not appear to be more SOB than at baseline today than before. They do, however, report, that patient's O2 requirement was recently increased from 2 to 4L home O2 due to some increased WOB at home. The aide does note that the patient's functional status has declined over the past 2 weeks. She notes that up until about two weeks ago, patient was speaking more and was occasionally able to get up and ambulate with a walker. Since then she has been more quiet, and has not been able to get out of bed.     In the ED: 98.7, 72, 118/55, 18, 100% 4L NC  Received: 1L NS, Ciprofloxacin 400mg IVx1, Oxycodone IR 15mg po x1 Ms. Ordonez is a 89 yo F with chronic hypoxic and hypercapenic respiratory failure 2/2 COPD (on 4L home O2), colon CA, dementia (AOx0 at baseline, minimally verbal), who presents to the [facility] ED after outpatient lab work showed an H/H of 7.7. Patient not presently answering questions, but per aide at bedside, she reports patient has been having intermittent rectal bleeding from her CA since last week. There was some blood yesterday so labs were drawn. Labs were sent to Dr. James's office who advised her to come to the ED for a transfusion. H/H is at baseline of 7.7, but oncology requesting transfusion as patient still intermittently with active bleeding. Per HHA and son at bedside, the patient does not appear to be more SOB than at baseline today than before. They do, however, report, that patient's O2 requirement was recently increased from 2 to 4L home O2 due to some increased WOB at home. The aide does note that the patient's functional status has declined over the past 2 weeks. She notes that up until about two weeks ago, patient was speaking more and was occasionally able to get up and ambulate with a walker. Since then she has been more quiet, and has not been able to get out of bed.     Of note, on past admission, patient was discharged with home hospice. The son dismissed hospice approximately one month ago as patient was prescribed a Fentanyl patch 2/2 pain. Patient subsequently became obtunded. This resolved with removal of the patch. Given his dissatisfaction, he is still not interested in hospice care. The son has two aides 12hrs/day x 7 days a week. He and his wife take care of patient otherwise. He reports he has made her room a "mini-hospital." He wants to take patient home upon completion of transfusion.     In the ED: 98.7, 72, 118/55, 18, 100% 4L NC  Received: 1L NS, Ciprofloxacin 400mg IVx1, Oxycodone IR 15mg po x1

## 2018-08-08 NOTE — ED PROVIDER NOTE - ATTENDING CONTRIBUTION TO CARE
89F p/w sent in by H/O Dr Sebastián Lockett for hgb 7.6.  As per aide at bedside pt is at baseline, no complaints.  A tech came to the house and catie blood on Monday, then they received a call to come to the ED for a blood transfusion.  Pt with a h/o colon cancer with a known chronic GIB, was admitted here in May for similar, got blood transfusions.  Pt demented and not answering questions.  Pt not aggressive.   Aide last saw her stool 5 days ago and it was yellowish however aide said son told her that the stool had a bit of red in it.  Pt is DNR/DNI.  Appears somewhat pale, minimally verbal, awake 89F p/w sent in by H/O Dr Sebastián Lockett for hgb 7.6.  As per aide at bedside pt is at baseline, no complaints.  A tech came to the house and catie blood on Monday, then they received a call to come to the ED for a blood transfusion.  Pt with a h/o colon cancer with a known chronic GIB, was admitted here in May for similar, got blood transfusions.  Pt demented and not answering questions.  Pt not aggressive.   Aide last saw her stool 5 days ago and it was yellowish however aide said son told her that the stool had a bit of red in it.  Pt is DNR/DNI.  Appears somewhat pale, minimally verbal, awake.  Plan check labs, urine, rectal temp, guiac, to be d/w H/O.  VS:  unremarkable    GEN - NAD; malaise, pallor A+O x1   HEAD - NC/AT     ENT - PEERL, EOMI, mucous membranes  dry , no discharge      NECK: Neck supple, non-tender without lymphadenopathy, no masses, no JVD  PULM - CTA b/l,  symmetric breath sounds  COR -  normal heart sounds    ABD - , ND, NT, soft,  BACK - no CVA tenderness, nontender spine     EXTREMS - no edema, no deformity, warm and well perfused    SKIN - no rash or bruising      NEUROLOGIC - alert, CN 2-12 intact, sensation nl, motor no focal deficit.

## 2018-08-08 NOTE — ED ADULT NURSE NOTE - NSIMPLEMENTINTERV_GEN_ALL_ED
Implemented All Fall Risk Interventions:  [place] to call system. Call bell, personal items and telephone within reach. Instruct patient to call for assistance. Room bathroom lighting operational. Non-slip footwear when patient is off stretcher. Physically safe environment: no spills, clutter or unnecessary equipment. Stretcher in lowest position, wheels locked, appropriate side rails in place. Provide visual cue, wrist band, yellow gown, etc. Monitor gait and stability. Monitor for mental status changes and reorient to person, place, and time. Review medications for side effects contributing to fall risk. Reinforce activity limits and safety measures with patient and family.

## 2018-08-08 NOTE — DISCHARGE NOTE ADULT - MEDICATION SUMMARY - MEDICATIONS TO STOP TAKING
I will STOP taking the medications listed below when I get home from the hospital:    aspirin 81 mg oral tablet  -- 1 tab(s) by mouth once a day I will STOP taking the medications listed below when I get home from the hospital:    simvastatin 20 mg oral tablet  -- 1 tab(s) by mouth once a day (at bedtime)    aspirin 81 mg oral tablet  -- 1 tab(s) by mouth once a day

## 2018-08-08 NOTE — H&P ADULT - ASSESSMENT
88 yo F with chronic hypoxic and hypercapenic respiratory failure 2/2 COPD (on 4L home O2), colon CA, dementia (AOx0 at baseline, minimally verbal), who presents to the [facility] ED for transfusion in the setting of chronic blood loss anemia 2/2 colon CA.

## 2018-08-08 NOTE — DISCHARGE NOTE ADULT - MEDICATION SUMMARY - MEDICATIONS TO TAKE
I will START or STAY ON the medications listed below when I get home from the hospital:    oxyCODONE 30 mg oral tablet  -- 1 tab(s) by mouth every 4 to 6 hours, As Needed  -- Indication: For Pain Control    mirtazapine 15 mg oral tablet  -- 1 tab(s) by mouth once a day (at bedtime)  -- Indication: For Dementia with agitation    escitalopram 10 mg oral tablet  -- 1 tab(s) by mouth once a day (at bedtime)  -- Indication: For Dementia with Agitation    Claritin 10 mg oral tablet  -- 1 tab(s) by mouth once a day  -- Indication: For Allergies    Zocor 20 mg oral tablet  -- 1 tab(s) by mouth once a day (at bedtime)  -- Indication: For Cholesterol    QUEtiapine 300 mg oral tablet  -- 1 tab(s) by mouth 2 times a day  -- Indication: For Dementia with agitation    ALPRAZolam 0.5 mg oral tablet  -- 1 tab(s) by mouth 4 times a day, As Needed  -- Indication: For Dementia with agitation    ProAir HFA 90 mcg/inh inhalation aerosol  -- 2 puff(s) inhaled 4 times a day, As Needed  -- Indication: For COPD (chronic obstructive pulmonary disease)    albuterol 2.5 mg/3 mL (0.083%) inhalation solution  -- 3 milliliter(s) inhaled every 6 hours, As Needed  -- Indication: For COPD (chronic obstructive pulmonary disease)    furosemide 40 mg oral tablet  -- 1 tab(s) by mouth once a day. Take 1 tablet tomorrow (8/9/18) and Friday (8/10/18). Thereafter, resume your regular schedule alternating between 1/2 tablet and 1 tablet daily  -- Indication: For Edema    ferrous sulfate 325 mg (65 mg elemental iron) oral tablet  -- 1 tab(s) by mouth once a day  -- Indication: For Anemia    MiraLax oral powder for reconstitution  -- 17 gram(s) by mouth once a day, As Needed  -- Indication: For Constipation    docusate sodium 100 mg oral capsule  -- 1 cap(s) by mouth 2 times a day, As Needed  -- Indication: For Constipation    Nuedexta 20 mg-10 mg oral capsule  -- 1 cap(s) by mouth every 12 hours. Resume on 8/11/18 upon completion of antibiotics  -- Indication: For Dementia with agitation    Cipro 250 mg oral tablet  -- 1 tab(s) by mouth 2 times a day through 8/10/18  -- Indication: For UTI    levothyroxine 100 mcg (0.1 mg) oral tablet  -- 1 tab(s) by mouth once a day  -- Indication: For Hypothyroid    Centrum Silver oral tablet  -- 1 tab(s) by mouth once a day  -- Indication: For Nutrition I will START or STAY ON the medications listed below when I get home from the hospital:    oxyCODONE 30 mg oral tablet  -- 1 tab(s) by mouth every 4 to 6 hours, As Needed  -- Indication: For Pain Control    mirtazapine 15 mg oral tablet  -- 1 tab(s) by mouth once a day (at bedtime)  -- Indication: For Dementia with agitation    escitalopram 10 mg oral tablet  -- 1 tab(s) by mouth once a day (at bedtime)  -- Indication: For Dementia with Agitation    Claritin 10 mg oral tablet  -- 1 tab(s) by mouth once a day  -- Indication: For Allergies    QUEtiapine 300 mg oral tablet  -- 1 tab(s) by mouth 2 times a day  -- Indication: For Dementia with agitation    ALPRAZolam 0.5 mg oral tablet  -- 1 tab(s) by mouth 4 times a day, As Needed  -- Indication: For Dementia with agitation    ProAir HFA 90 mcg/inh inhalation aerosol  -- 2 puff(s) inhaled 4 times a day, As Needed  -- Indication: For COPD (chronic obstructive pulmonary disease)    albuterol 2.5 mg/3 mL (0.083%) inhalation solution  -- 3 milliliter(s) inhaled every 6 hours, As Needed  -- Indication: For COPD (chronic obstructive pulmonary disease)    furosemide 40 mg oral tablet  -- 1 tab(s) by mouth once a day. Take 1 tablet tomorrow (8/9/18) and Friday (8/10/18). Thereafter, resume your regular schedule alternating between 1/2 tablet and 1 tablet daily  -- Indication: For Edema    ferrous sulfate 325 mg (65 mg elemental iron) oral tablet  -- 1 tab(s) by mouth once a day  -- Indication: For Anemia    MiraLax oral powder for reconstitution  -- 17 gram(s) by mouth once a day, As Needed  -- Indication: For Constipation    docusate sodium 100 mg oral capsule  -- 1 cap(s) by mouth 2 times a day, As Needed  -- Indication: For Constipation    Nuedexta 20 mg-10 mg oral capsule  -- 1 cap(s) by mouth every 12 hours. Resume on 8/11/18 upon completion of antibiotics  -- Indication: For Dementia with agitation    Cipro 250 mg oral tablet  -- 1 tab(s) by mouth 2 times a day through 8/10/18  -- Indication: For UTI    levothyroxine 100 mcg (0.1 mg) oral tablet  -- 1 tab(s) by mouth once a day  -- Indication: For Hypothyroid    Centrum Silver oral tablet  -- 1 tab(s) by mouth once a day  -- Indication: For Nutrition

## 2018-08-08 NOTE — DISCHARGE NOTE ADULT - SECONDARY DIAGNOSIS.
Chronic respiratory failure with hypoxia and hypercapnia COPD (chronic obstructive pulmonary disease)

## 2018-08-09 VITALS — WEIGHT: 154.32 LBS

## 2018-08-09 LAB
BUN SERPL-MCNC: 18 MG/DL — SIGNIFICANT CHANGE UP (ref 7–23)
CALCIUM SERPL-MCNC: 9 MG/DL — SIGNIFICANT CHANGE UP (ref 8.4–10.5)
CHLORIDE SERPL-SCNC: 91 MMOL/L — LOW (ref 98–107)
CO2 SERPL-SCNC: 40 MMOL/L — HIGH (ref 22–31)
CREAT SERPL-MCNC: 1.34 MG/DL — HIGH (ref 0.5–1.3)
GLUCOSE SERPL-MCNC: 108 MG/DL — HIGH (ref 70–99)
HCT VFR BLD CALC: 29.9 % — LOW (ref 34.5–45)
HGB BLD-MCNC: 8.7 G/DL — LOW (ref 11.5–15.5)
MCHC RBC-ENTMCNC: 24.2 PG — LOW (ref 27–34)
MCHC RBC-ENTMCNC: 29.1 % — LOW (ref 32–36)
MCV RBC AUTO: 83.1 FL — SIGNIFICANT CHANGE UP (ref 80–100)
NRBC # FLD: 0 — SIGNIFICANT CHANGE UP
PLATELET # BLD AUTO: 159 K/UL — SIGNIFICANT CHANGE UP (ref 150–400)
PMV BLD: 10 FL — SIGNIFICANT CHANGE UP (ref 7–13)
POTASSIUM SERPL-MCNC: 4.1 MMOL/L — SIGNIFICANT CHANGE UP (ref 3.5–5.3)
POTASSIUM SERPL-SCNC: 4.1 MMOL/L — SIGNIFICANT CHANGE UP (ref 3.5–5.3)
RBC # BLD: 3.6 M/UL — LOW (ref 3.8–5.2)
RBC # FLD: 19.7 % — HIGH (ref 10.3–14.5)
SODIUM SERPL-SCNC: 138 MMOL/L — SIGNIFICANT CHANGE UP (ref 135–145)
SPECIMEN SOURCE: SIGNIFICANT CHANGE UP
WBC # BLD: 7.54 K/UL — SIGNIFICANT CHANGE UP (ref 3.8–10.5)
WBC # FLD AUTO: 7.54 K/UL — SIGNIFICANT CHANGE UP (ref 3.8–10.5)

## 2018-08-09 RX ADMIN — Medication 100 MICROGRAM(S): at 06:37

## 2018-08-09 RX ADMIN — Medication 40 MILLIGRAM(S): at 06:37

## 2018-08-09 RX ADMIN — Medication 250 MILLIGRAM(S): at 06:37

## 2018-08-09 RX ADMIN — QUETIAPINE FUMARATE 300 MILLIGRAM(S): 200 TABLET, FILM COATED ORAL at 06:37

## 2018-08-10 DIAGNOSIS — Z71.89 OTHER SPECIFIED COUNSELING: ICD-10-CM

## 2018-08-10 LAB
-  AMIKACIN: SIGNIFICANT CHANGE UP
-  AMPICILLIN/SULBACTAM: SIGNIFICANT CHANGE UP
-  AMPICILLIN: SIGNIFICANT CHANGE UP
-  AZTREONAM: SIGNIFICANT CHANGE UP
-  CEFAZOLIN: SIGNIFICANT CHANGE UP
-  CEFEPIME: SIGNIFICANT CHANGE UP
-  CEFOXITIN: SIGNIFICANT CHANGE UP
-  CEFTAZIDIME: SIGNIFICANT CHANGE UP
-  CEFTRIAXONE: SIGNIFICANT CHANGE UP
-  CIPROFLOXACIN: SIGNIFICANT CHANGE UP
-  ERTAPENEM: SIGNIFICANT CHANGE UP
-  GENTAMICIN: SIGNIFICANT CHANGE UP
-  IMIPENEM: SIGNIFICANT CHANGE UP
-  LEVOFLOXACIN: SIGNIFICANT CHANGE UP
-  MEROPENEM: SIGNIFICANT CHANGE UP
-  NITROFURANTOIN: SIGNIFICANT CHANGE UP
-  PIPERACILLIN/TAZOBACTAM: SIGNIFICANT CHANGE UP
-  TIGECYCLINE: SIGNIFICANT CHANGE UP
-  TOBRAMYCIN: SIGNIFICANT CHANGE UP
-  TRIMETHOPRIM/SULFAMETHOXAZOLE: SIGNIFICANT CHANGE UP
BACTERIA UR CULT: SIGNIFICANT CHANGE UP
METHOD TYPE: SIGNIFICANT CHANGE UP
ORGANISM # SPEC MICROSCOPIC CNT: SIGNIFICANT CHANGE UP
ORGANISM # SPEC MICROSCOPIC CNT: SIGNIFICANT CHANGE UP

## 2018-08-14 DIAGNOSIS — Z71.89 OTHER SPECIFIED COUNSELING: ICD-10-CM

## 2018-08-15 ENCOUNTER — INBOUND DOCUMENT (OUTPATIENT)
Age: 83
End: 2018-08-15

## 2018-10-09 ENCOUNTER — APPOINTMENT (OUTPATIENT)
Age: 83
End: 2018-10-09
Payer: MEDICARE

## 2018-10-09 VITALS
OXYGEN SATURATION: 94 % | SYSTOLIC BLOOD PRESSURE: 106 MMHG | HEART RATE: 74 BPM | DIASTOLIC BLOOD PRESSURE: 70 MMHG | TEMPERATURE: 97.3 F

## 2018-10-09 DIAGNOSIS — F03.90 UNSPECIFIED DEMENTIA W/OUT BEHAVIORAL DISTURBANCE: ICD-10-CM

## 2018-10-09 PROCEDURE — 99214 OFFICE O/P EST MOD 30 MIN: CPT

## 2018-10-09 RX ORDER — ALBUTEROL SULFATE 90 UG/1
108 (90 BASE) AEROSOL, METERED RESPIRATORY (INHALATION)
Qty: 1 | Refills: 3 | Status: ACTIVE | COMMUNITY
Start: 2017-03-08 | End: 1900-01-01

## 2018-12-12 ENCOUNTER — APPOINTMENT (OUTPATIENT)
Dept: PULMONOLOGY | Facility: CLINIC | Age: 83
End: 2018-12-12
Payer: MEDICARE

## 2018-12-12 VITALS — OXYGEN SATURATION: 100 % | HEART RATE: 92 BPM | SYSTOLIC BLOOD PRESSURE: 140 MMHG | DIASTOLIC BLOOD PRESSURE: 74 MMHG

## 2018-12-12 PROCEDURE — 99214 OFFICE O/P EST MOD 30 MIN: CPT

## 2018-12-13 ENCOUNTER — APPOINTMENT (OUTPATIENT)
Dept: WOUND CARE | Facility: CLINIC | Age: 83
End: 2018-12-13
Payer: MEDICARE

## 2018-12-13 PROCEDURE — 99203 OFFICE O/P NEW LOW 30 MIN: CPT

## 2019-01-10 ENCOUNTER — APPOINTMENT (OUTPATIENT)
Dept: WOUND CARE | Facility: CLINIC | Age: 84
End: 2019-01-10
Payer: MEDICARE

## 2019-01-10 VITALS — DIASTOLIC BLOOD PRESSURE: 76 MMHG | TEMPERATURE: 98.2 F | SYSTOLIC BLOOD PRESSURE: 124 MMHG | HEART RATE: 88 BPM

## 2019-01-10 DIAGNOSIS — M79.89 OTHER SPECIFIED SOFT TISSUE DISORDERS: ICD-10-CM

## 2019-01-10 PROCEDURE — 97597 DBRDMT OPN WND 1ST 20 CM/<: CPT

## 2019-01-10 RX ORDER — MUPIROCIN 20 MG/G
2 OINTMENT TOPICAL
Qty: 1 | Refills: 2 | Status: ACTIVE | COMMUNITY
Start: 2019-01-10 | End: 1900-01-01

## 2019-01-10 NOTE — ASSESSMENT
[FreeTextEntry1] : 89yF presents with left posterior heel pressure eschar\par - pt seen and eval\par - accompanied by son and daughter in law\par - discussed with family that etiology of wounds is most likely due to pressure in bed and in chair family understood\par - Rx mupirocin \par - no signs of infection\par - cont offloading heels at all times\par - apply mupirocin and DSD daily\par - family understood\par - RTC 1 month

## 2019-01-10 NOTE — HISTORY OF PRESENT ILLNESS
[FreeTextEntry1] : 89y F presents to clinic with chief complaint of left heel wound. PT accompanied by son who was provider of information. Pt was on hospice for cancer treatment and has been living at home with son was put on meds to sedate and she developed heel wounds bilaterally. Pt son has been applying santyl to wound daily and offloading in bed with booties. Saw vascular vascular previously for upper extremity DVT. no intervention done on legs.

## 2019-01-10 NOTE — PHYSICAL EXAM
[1+] : left 1+ [Calm] : calm [de-identified] : non ambulatory [de-identified] : unable to determine due to pt mental status [FreeTextEntry1] : posterior heel [FreeTextEntry2] : 0.8 [FreeTextEntry3] : 0.3 [FreeTextEntry4] : 0.1

## 2019-03-07 ENCOUNTER — APPOINTMENT (OUTPATIENT)
Dept: WOUND CARE | Facility: CLINIC | Age: 84
End: 2019-03-07
Payer: MEDICARE

## 2019-03-07 VITALS — HEART RATE: 87 BPM | DIASTOLIC BLOOD PRESSURE: 73 MMHG | SYSTOLIC BLOOD PRESSURE: 169 MMHG

## 2019-03-07 DIAGNOSIS — L97.422 NON-PRESSURE CHRONIC ULCER OF LEFT HEEL AND MIDFOOT WITH FAT LAYER EXPOSED: ICD-10-CM

## 2019-03-07 DIAGNOSIS — L89.622 PRESSURE ULCER OF LEFT HEEL, STAGE 2: ICD-10-CM

## 2019-03-07 PROCEDURE — 99213 OFFICE O/P EST LOW 20 MIN: CPT

## 2019-03-07 NOTE — PHYSICAL EXAM
[1+] : left 1+ [Calm] : calm [de-identified] : non ambulatory [de-identified] : unable to determine due to pt mental status [FreeTextEntry1] : posterior heel- healed

## 2019-03-07 NOTE — ASSESSMENT
[FreeTextEntry1] : 89yF presents with left posterior heel pressure eschar now healed. \par - pt seen and eval\par - accompanied by son and daughter in law\par - debridement of toenails X10 using sterile nippers. Betadine applied. \par - wounds healed. \par - cont offloading heels at all times\par - family understood\par - PRN

## 2019-03-07 NOTE — HISTORY OF PRESENT ILLNESS
[FreeTextEntry1] : 89y F presents to clinic with chief complaint of left heel wound. PT accompanied by son who was provider of information. Pt was on hospice for cancer treatment and has been living at home with son was put on meds to sedate and she developed heel wounds bilaterally. Pt son has been offloading heels with wedges and providing wound care of daily of santyl and mupirocin. Son relates wounds have heeled. Son says she has more alert recently and feeling well, no f/n/v/sob.

## 2019-03-13 ENCOUNTER — APPOINTMENT (OUTPATIENT)
Dept: PULMONOLOGY | Facility: CLINIC | Age: 84
End: 2019-03-13
Payer: MEDICARE

## 2019-03-13 VITALS — OXYGEN SATURATION: 98 % | HEART RATE: 85 BPM | SYSTOLIC BLOOD PRESSURE: 130 MMHG | DIASTOLIC BLOOD PRESSURE: 70 MMHG

## 2019-03-13 PROCEDURE — 99214 OFFICE O/P EST MOD 30 MIN: CPT

## 2019-03-14 ENCOUNTER — RX RENEWAL (OUTPATIENT)
Age: 84
End: 2019-03-14

## 2019-03-14 NOTE — DISCUSSION/SUMMARY
[FreeTextEntry1] : HYpoxia, due to obstructive airways disease:\par \par O2 saturation at rest on room air 85%\par \par On oxygen 4 lpm  95% at rest\par \par \par She will continue albuterol via nebulizer and supplemental oxygen\par \par [facility]

## 2019-03-14 NOTE — HISTORY OF PRESENT ILLNESS
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 Carcinoid tumor has not increased in size. She was diagnosed with adenocarcinoma of colon.\par Family have deferred surgery due to poor status of patient, and continue observation and conservative care.\par \par She has remained able to eat. She is cared for at home.  She has been comfortable.  she uses oxygen 4 lpm to relieve hypoxia and dyspnea. \par Sshe has obstructive airways disease, using nebulized medications.  \par \par She is using home oxygen.  Her son has had conflict with oxygen supplier, asks that company is changed.  \par \par She also uses nebulized albuterol 3 times per day.\par \par \par She has cognitive and speech deficits due to dementia and depression. She is on new regimen. Xanax and Neudexa\par \par \par PSH cholecystectomy ,\par colon surgery 2013\par cataracts\par \par She has had her influenza vaccine this season.\par

## 2019-03-14 NOTE — PHYSICAL EXAM
[General Appearance - Well Developed] : well developed [General Appearance - Well Nourished] : well nourished [General Appearance - In No Acute Distress] : no acute distress [Normal Conjunctiva] : the conjunctiva exhibited no abnormalities [Normal Oropharynx] : normal oropharynx [Neck Cervical Mass (___cm)] : no neck mass was observed [Heart Rate And Rhythm] : heart rate and rhythm were normal [Heart Sounds] : normal S1 and S2 [Murmurs] : no murmurs present [Arterial Pulses Normal] : the arterial pulses were normal [Respiration, Rhythm And Depth] : normal respiratory rhythm and effort [Auscultation Breath Sounds / Voice Sounds] : lungs were clear to auscultation bilaterally [Kyphosis] : kyphosis [Bowel Sounds] : normal bowel sounds [Abdomen Soft] : soft [Abdomen Tenderness] : non-tender [] : no hepato-splenomegaly [Nail Clubbing] : no clubbing of the fingernails [Low Lying Soft Palate] : no low lying soft palate [FreeTextEntry1] : alert, with  temor

## 2019-03-14 NOTE — REVIEW OF SYSTEMS
[Cough] : cough [Fever] : no fever [Chills] : no chills [Nasal Congestion] : no nasal congestion [Postnasal Drip] : no postnasal drip [Sputum] : not coughing up ~M sputum [Dyspnea] : no dyspnea [Chest Tightness] : no chest tightness [Hay Fever] : no hay fever [Heartburn] : no heartburn

## 2019-06-06 ENCOUNTER — RX RENEWAL (OUTPATIENT)
Age: 84
End: 2019-06-06

## 2019-06-06 RX ORDER — COLLAGENASE SANTYL 250 [ARB'U]/G
250 OINTMENT TOPICAL DAILY
Qty: 60 | Refills: 0 | Status: ACTIVE | COMMUNITY
Start: 2018-12-13 | End: 1900-01-01

## 2019-06-11 ENCOUNTER — APPOINTMENT (OUTPATIENT)
Dept: PULMONOLOGY | Facility: CLINIC | Age: 84
End: 2019-06-11
Payer: MEDICARE

## 2019-06-11 VITALS — OXYGEN SATURATION: 98 % | DIASTOLIC BLOOD PRESSURE: 56 MMHG | SYSTOLIC BLOOD PRESSURE: 142 MMHG | HEART RATE: 84 BPM

## 2019-06-11 DIAGNOSIS — R09.02 HYPOXEMIA: ICD-10-CM

## 2019-06-11 DIAGNOSIS — C18.9 MALIGNANT NEOPLASM OF COLON, UNSPECIFIED: ICD-10-CM

## 2019-06-11 PROCEDURE — 99214 OFFICE O/P EST MOD 30 MIN: CPT

## 2019-06-11 NOTE — DISCUSSION/SUMMARY
[FreeTextEntry1] : COPD\par colon cancer\par \par stable carcinoid lung tumor\par stable respiratory status\par \par \par OXYGEN SATURATION IN OFFICE ON ROOM AIR AT REST IS 88%, taken by me\par \par Continue oxygen 2-4 lpm, follow oxygen saturation at home\par \par continues on medications for anxiety\par \par nebulized albuterol as needed\par \par soft diet\par \par Francisco Rai MD [facility] \par \par

## 2019-06-11 NOTE — HISTORY OF PRESENT ILLNESS
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  She has had occasional blood in stool with bowel movements.  She is able to eat well, soft diet. \par Family have deferred surgery due to poor status of patient, and continue observation and conservative care.  She is comfortable.  \par She uses oxygen 4 lpm.  Her son reports that oxygen saturation drops to 80% off oxygen. \par \par OXYGEN SATURATION IN OFFICE ON ROOM AIR AT REST IS 88%\par \par She has obstructive airways disease.\par \par She is continuing to use nebulized albuterol 3 times per day.\par \par \par She has cognitive and speech deficits due to dementia and depression. She is doing better on. Xanax and Neudexa\par \par \par PSH cholecystectomy ,\par colon surgery 2013\par cataracts\par \par She has had her influenza vaccine this season.\par

## 2019-06-11 NOTE — PHYSICAL EXAM
[General Appearance - Well Developed] : well developed [General Appearance - Well Nourished] : well nourished [Normal Conjunctiva] : the conjunctiva exhibited no abnormalities [General Appearance - In No Acute Distress] : no acute distress [Normal Oropharynx] : normal oropharynx [Neck Cervical Mass (___cm)] : no neck mass was observed [Murmurs] : no murmurs present [Heart Rate And Rhythm] : heart rate and rhythm were normal [Heart Sounds] : normal S1 and S2 [Arterial Pulses Normal] : the arterial pulses were normal [Auscultation Breath Sounds / Voice Sounds] : lungs were clear to auscultation bilaterally [Respiration, Rhythm And Depth] : normal respiratory rhythm and effort [Kyphosis] : kyphosis [Abdomen Tenderness] : non-tender [Abdomen Soft] : soft [Bowel Sounds] : normal bowel sounds [] : no hepato-splenomegaly [Nail Clubbing] : no clubbing of the fingernails [Low Lying Soft Palate] : no low lying soft palate [FreeTextEntry1] : alert, with  temor

## 2019-10-08 ENCOUNTER — APPOINTMENT (OUTPATIENT)
Dept: PULMONOLOGY | Facility: CLINIC | Age: 84
End: 2019-10-08
Payer: MEDICARE

## 2019-10-08 VITALS
SYSTOLIC BLOOD PRESSURE: 132 MMHG | DIASTOLIC BLOOD PRESSURE: 60 MMHG | OXYGEN SATURATION: 96 % | RESPIRATION RATE: 16 BRPM | HEART RATE: 93 BPM

## 2019-10-08 DIAGNOSIS — J44.9 CHRONIC OBSTRUCTIVE PULMONARY DISEASE, UNSPECIFIED: ICD-10-CM

## 2019-10-08 DIAGNOSIS — Z87.09 PERSONAL HISTORY OF OTHER DISEASES OF THE RESPIRATORY SYSTEM: ICD-10-CM

## 2019-10-08 PROCEDURE — 99214 OFFICE O/P EST MOD 30 MIN: CPT

## 2019-10-08 RX ORDER — BUDESONIDE AND FORMOTEROL FUMARATE DIHYDRATE 160; 4.5 UG/1; UG/1
160-4.5 AEROSOL RESPIRATORY (INHALATION) TWICE DAILY
Qty: 2 | Refills: 2 | Status: ACTIVE | COMMUNITY
Start: 2019-10-08 | End: 1900-01-01

## 2019-10-08 RX ORDER — PREDNISONE 20 MG/1
20 TABLET ORAL DAILY
Qty: 5 | Refills: 0 | Status: ACTIVE | COMMUNITY
Start: 2019-10-08 | End: 1900-01-01

## 2019-10-08 NOTE — PHYSICAL EXAM
[General Appearance - Well Developed] : well developed [General Appearance - Well Nourished] : well nourished [Normal Conjunctiva] : the conjunctiva exhibited no abnormalities [General Appearance - In No Acute Distress] : no acute distress [Normal Oropharynx] : normal oropharynx [Neck Cervical Mass (___cm)] : no neck mass was observed [Heart Rate And Rhythm] : heart rate and rhythm were normal [Murmurs] : no murmurs present [Heart Sounds] : normal S1 and S2 [Arterial Pulses Normal] : the arterial pulses were normal [Respiration, Rhythm And Depth] : normal respiratory rhythm and effort [Auscultation Breath Sounds / Voice Sounds] : lungs were clear to auscultation bilaterally [Kyphosis] : kyphosis [Bowel Sounds] : normal bowel sounds [Abdomen Soft] : soft [Abdomen Tenderness] : non-tender [] : no hepato-splenomegaly [Nail Clubbing] : no clubbing of the fingernails [Low Lying Soft Palate] : no low lying soft palate [FreeTextEntry1] : alert, with  temor

## 2019-10-18 RX ORDER — DOXYCYCLINE HYCLATE 100 MG/1
100 CAPSULE ORAL
Qty: 14 | Refills: 0 | Status: ACTIVE | COMMUNITY
Start: 2019-10-18 | End: 1900-01-01
